# Patient Record
Sex: FEMALE | Race: WHITE | NOT HISPANIC OR LATINO | Employment: FULL TIME | URBAN - METROPOLITAN AREA
[De-identification: names, ages, dates, MRNs, and addresses within clinical notes are randomized per-mention and may not be internally consistent; named-entity substitution may affect disease eponyms.]

---

## 2019-12-25 ENCOUNTER — HOSPITAL ENCOUNTER (EMERGENCY)
Facility: HOSPITAL | Age: 24
Discharge: HOME | End: 2019-12-25
Attending: EMERGENCY MEDICINE
Payer: COMMERCIAL

## 2019-12-25 VITALS
SYSTOLIC BLOOD PRESSURE: 114 MMHG | RESPIRATION RATE: 14 BRPM | DIASTOLIC BLOOD PRESSURE: 65 MMHG | TEMPERATURE: 98 F | OXYGEN SATURATION: 99 % | BODY MASS INDEX: 19.99 KG/M2 | HEIGHT: 65 IN | WEIGHT: 120 LBS | HEART RATE: 107 BPM

## 2019-12-25 DIAGNOSIS — T40.601A OPIATE OVERDOSE, ACCIDENTAL OR UNINTENTIONAL, INITIAL ENCOUNTER: Primary | ICD-10-CM

## 2019-12-25 LAB
ALBUMIN SERPL BCP-MCNC: 3.9 G/DL (ref 3.5–5.2)
ALP SERPL-CCNC: 77 U/L (ref 55–135)
ALT SERPL W/O P-5'-P-CCNC: 14 U/L (ref 10–44)
AMPHET+METHAMPHET UR QL: NORMAL
ANION GAP SERPL CALC-SCNC: 8 MMOL/L (ref 8–16)
APAP SERPL-MCNC: <10 UG/ML (ref 10–20)
AST SERPL-CCNC: 22 U/L (ref 10–40)
B-HCG UR QL: NEGATIVE
BACTERIA #/AREA URNS HPF: ABNORMAL /HPF
BARBITURATES UR QL SCN>200 NG/ML: NEGATIVE
BASOPHILS # BLD AUTO: 0.06 K/UL (ref 0–0.2)
BASOPHILS NFR BLD: 0.4 % (ref 0–1.9)
BENZODIAZ UR QL SCN>200 NG/ML: NEGATIVE
BILIRUB SERPL-MCNC: 0.9 MG/DL (ref 0.1–1)
BILIRUB UR QL STRIP: NEGATIVE
BUN SERPL-MCNC: 18 MG/DL (ref 6–20)
BZE UR QL SCN: NEGATIVE
CALCIUM SERPL-MCNC: 9 MG/DL (ref 8.7–10.5)
CANNABINOIDS UR QL SCN: NEGATIVE
CHLORIDE SERPL-SCNC: 104 MMOL/L (ref 95–110)
CLARITY UR: ABNORMAL
CO2 SERPL-SCNC: 28 MMOL/L (ref 23–29)
COLOR UR: YELLOW
CREAT SERPL-MCNC: 0.9 MG/DL (ref 0.5–1.4)
CREAT UR-MCNC: 191 MG/DL (ref 15–325)
CTP QC/QA: YES
DIFFERENTIAL METHOD: ABNORMAL
EOSINOPHIL # BLD AUTO: 0.1 K/UL (ref 0–0.5)
EOSINOPHIL NFR BLD: 0.9 % (ref 0–8)
ERYTHROCYTE [DISTWIDTH] IN BLOOD BY AUTOMATED COUNT: 12 % (ref 11.5–14.5)
EST. GFR  (AFRICAN AMERICAN): >60 ML/MIN/1.73 M^2
EST. GFR  (NON AFRICAN AMERICAN): >60 ML/MIN/1.73 M^2
ETHANOL SERPL-MCNC: <5 MG/DL
GLUCOSE SERPL-MCNC: 119 MG/DL (ref 70–110)
GLUCOSE UR QL STRIP: NEGATIVE
HCT VFR BLD AUTO: 42.5 % (ref 37–48.5)
HGB BLD-MCNC: 13.9 G/DL (ref 12–16)
HGB UR QL STRIP: NEGATIVE
HYALINE CASTS #/AREA URNS LPF: 123 /LPF
IMM GRANULOCYTES # BLD AUTO: 0.16 K/UL (ref 0–0.04)
IMM GRANULOCYTES NFR BLD AUTO: 1.2 % (ref 0–0.5)
KETONES UR QL STRIP: ABNORMAL
LEUKOCYTE ESTERASE UR QL STRIP: ABNORMAL
LYMPHOCYTES # BLD AUTO: 2.4 K/UL (ref 1–4.8)
LYMPHOCYTES NFR BLD: 17.4 % (ref 18–48)
MCH RBC QN AUTO: 30.3 PG (ref 27–31)
MCHC RBC AUTO-ENTMCNC: 32.7 G/DL (ref 32–36)
MCV RBC AUTO: 93 FL (ref 82–98)
MICROSCOPIC COMMENT: ABNORMAL
MONOCYTES # BLD AUTO: 0.9 K/UL (ref 0.3–1)
MONOCYTES NFR BLD: 6.4 % (ref 4–15)
NEUTROPHILS # BLD AUTO: 10.1 K/UL (ref 1.8–7.7)
NEUTROPHILS NFR BLD: 73.7 % (ref 38–73)
NITRITE UR QL STRIP: NEGATIVE
NRBC BLD-RTO: 0 /100 WBC
OPIATES UR QL SCN: NORMAL
PCP UR QL SCN>25 NG/ML: NEGATIVE
PH UR STRIP: 6 [PH] (ref 5–8)
PLATELET # BLD AUTO: 273 K/UL (ref 150–350)
PMV BLD AUTO: 9.5 FL (ref 9.2–12.9)
POTASSIUM SERPL-SCNC: 3.3 MMOL/L (ref 3.5–5.1)
PROT SERPL-MCNC: 7.7 G/DL (ref 6–8.4)
PROT UR QL STRIP: ABNORMAL
RBC # BLD AUTO: 4.58 M/UL (ref 4–5.4)
RBC #/AREA URNS HPF: 9 /HPF (ref 0–4)
SODIUM SERPL-SCNC: 140 MMOL/L (ref 136–145)
SP GR UR STRIP: 1.02 (ref 1–1.03)
SQUAMOUS #/AREA URNS HPF: 6 /HPF
TOXICOLOGY INFORMATION: NORMAL
TSH SERPL DL<=0.005 MIU/L-ACNC: 0.97 UIU/ML (ref 0.34–5.6)
URN SPEC COLLECT METH UR: ABNORMAL
UROBILINOGEN UR STRIP-ACNC: NEGATIVE EU/DL
WBC # BLD AUTO: 13.67 K/UL (ref 3.9–12.7)
WBC #/AREA URNS HPF: >100 /HPF (ref 0–5)

## 2019-12-25 PROCEDURE — 85025 COMPLETE CBC W/AUTO DIFF WBC: CPT

## 2019-12-25 PROCEDURE — 87147 CULTURE TYPE IMMUNOLOGIC: CPT

## 2019-12-25 PROCEDURE — 80307 DRUG TEST PRSMV CHEM ANLYZR: CPT

## 2019-12-25 PROCEDURE — 99283 EMERGENCY DEPT VISIT LOW MDM: CPT

## 2019-12-25 PROCEDURE — 25000003 PHARM REV CODE 250: Performed by: EMERGENCY MEDICINE

## 2019-12-25 PROCEDURE — 81001 URINALYSIS AUTO W/SCOPE: CPT

## 2019-12-25 PROCEDURE — 80053 COMPREHEN METABOLIC PANEL: CPT

## 2019-12-25 PROCEDURE — 84443 ASSAY THYROID STIM HORMONE: CPT

## 2019-12-25 PROCEDURE — 81025 URINE PREGNANCY TEST: CPT | Performed by: EMERGENCY MEDICINE

## 2019-12-25 PROCEDURE — 80320 DRUG SCREEN QUANTALCOHOLS: CPT

## 2019-12-25 PROCEDURE — 87086 URINE CULTURE/COLONY COUNT: CPT

## 2019-12-25 RX ORDER — POTASSIUM CHLORIDE 20 MEQ/1
40 TABLET, EXTENDED RELEASE ORAL
Status: COMPLETED | OUTPATIENT
Start: 2019-12-25 | End: 2019-12-25

## 2019-12-25 RX ORDER — NALOXONE HYDROCHLORIDE 4 MG/.1ML
SPRAY NASAL
Qty: 1 EACH | Refills: 11 | Status: SHIPPED | OUTPATIENT
Start: 2019-12-25 | End: 2021-05-22

## 2019-12-25 RX ADMIN — POTASSIUM CHLORIDE 40 MEQ: 20 TABLET, EXTENDED RELEASE ORAL at 04:12

## 2019-12-25 NOTE — ED PROVIDER NOTES
Encounter Date: 12/25/2019       History     Chief Complaint   Patient presents with    Drug Overdose     Heroin     Patient here via EMS reportedly found unresponsive by police department was flagged down by her friend she had reportedly injected heroin states that it was her usual dose of heroin but she received it from someone she does not know patient received 4 mg of Narcan intranasally by police department with improvement her respiratory rate at EMS arrival patient was mildly tachycardic her oxygen saturation 100% she was alert and oriented she denies any homicidal suicidal ideations states that she has been abusing heroin for approximately 3 months would like referral for substance abuse she denies any auditory visual hallucinations shortness of breath states he feels to baseline at this point but slightly tremulous        Review of patient's allergies indicates:  No Known Allergies  No past medical history on file.  Past Surgical History:   Procedure Laterality Date    APPENDECTOMY       Family History   Problem Relation Age of Onset    Thyroid disease Mother     Hypertension Father      Social History     Tobacco Use    Smoking status: Current Some Day Smoker     Packs/day: 0.00    Smokeless tobacco: Never Used    Tobacco comment: patient reports she only smokes 2-3 cigarettes a week    Substance Use Topics    Alcohol use: Yes     Alcohol/week: 1.0 standard drinks     Types: 1 Glasses of wine per week    Drug use: Not on file     Comment: SEE PREVIOUS ER VISITS     Review of Systems   Constitutional: Negative.    HENT: Negative.    Eyes: Negative.    Respiratory: Negative.    Cardiovascular: Negative.    Gastrointestinal: Negative.    Endocrine: Negative.    Genitourinary: Negative.    Musculoskeletal: Negative.    Skin: Negative.    Allergic/Immunologic: Negative.    Neurological: Negative.    Hematological: Negative.    Psychiatric/Behavioral: Negative.        Physical Exam     Initial Vitals  [12/25/19 1502]   BP Pulse Resp Temp SpO2   112/62 (!) 122 14 97.9 °F (36.6 °C) 100 %      MAP       --         Physical Exam    Constitutional: She appears well-developed and well-nourished. No distress.   HENT:   Head: Normocephalic and atraumatic.   Right Ear: External ear normal.   Left Ear: External ear normal.   Nose: Nose normal.   Mouth/Throat: Oropharynx is clear and moist.   Eyes: Conjunctivae and EOM are normal. Pupils are equal, round, and reactive to light.   Neck: Normal range of motion. Neck supple.   Cardiovascular: Normal rate, regular rhythm, normal heart sounds and intact distal pulses.   Pulmonary/Chest: Breath sounds normal.   Abdominal: Soft. Bowel sounds are normal. She exhibits no distension. There is no tenderness.   Musculoskeletal: Normal range of motion. She exhibits no edema.   Neurological: She is alert and oriented to person, place, and time. She has normal strength. GCS score is 15. GCS eye subscore is 4. GCS verbal subscore is 5. GCS motor subscore is 6.   Skin: Skin is warm and dry. Capillary refill takes less than 2 seconds. There is pallor.   Psychiatric: She has a normal mood and affect. Her behavior is normal.         ED Course   Procedures  Labs Reviewed   CBC W/ AUTO DIFFERENTIAL   COMPREHENSIVE METABOLIC PANEL   TSH   URINALYSIS, REFLEX TO URINE CULTURE   DRUG SCREEN PANEL, URINE EMERGENCY   ALCOHOL,MEDICAL (ETHANOL)   ACETAMINOPHEN LEVEL   POCT URINE PREGNANCY          Imaging Results    None                                          Clinical Impression:       ICD-10-CM ICD-9-CM   1. Opiate overdose, accidental or unintentional, initial encounter T40.601A 965.00     E850.2                             Red Knowles MD  12/25/19 8495

## 2019-12-25 NOTE — ED NOTES
"Patient identifiers for Mishel Ruelas checked and correct.  LOC: Patient is awake, alert, and aware of environment and seems anxious. Patient is oriented x 3 and speaking appropriately.  APPEARANCE: Patient resting comfortably and in no acute distress. Patient is clean and well groomed, patient's clothing is properly fastened.  SKIN: The skin is warm and dry. Patient has normal skin turgor and moist mucus membrances. Injection marks to R arm , states uses IV drugs  MUSKULOSKELETAL: Patient is moving all extremities well, no obvious deformities noted. Pulses intact.   RESPIRATORY: Airway is open and patent. Respirations are spontaneous and non-labored with normal effort and rate.  CARDIAC: Patient has a normal rate and rhythm. No peripheral edema noted. Capillary refill < 3 seconds.  ABDOMEN: No distention noted. Bowel sounds active in all 4 quadrants. Soft and non-tender upon palpation.  NEUROLOGICAL: PERRL. Facial expression is symmetrical. Hand grasps are equal bilaterally. States " I got a bad hit and I think it was Fentanyl, knocked out after injection.   Allergies reported:   Review of patient's allergies indicates:  No Known Allergies  "

## 2019-12-25 NOTE — ED TRIAGE NOTES
Admit per [facility] to ER 21, 25 yo female. Friend stopped car by  when she became unresponsive and started foaming at the mouth.  gave 4 mg Narcan intranasally and she slowly started coming around. States she took usual amount of Heroin but bought it from a different source.

## 2019-12-27 LAB
BACTERIA UR CULT: ABNORMAL
BACTERIA UR CULT: ABNORMAL

## 2020-12-19 ENCOUNTER — HOSPITAL ENCOUNTER (EMERGENCY)
Facility: HOSPITAL | Age: 25
Discharge: HOME | End: 2020-12-19
Attending: EMERGENCY MEDICINE
Payer: COMMERCIAL

## 2020-12-19 VITALS
SYSTOLIC BLOOD PRESSURE: 125 MMHG | HEART RATE: 89 BPM | WEIGHT: 120 LBS | DIASTOLIC BLOOD PRESSURE: 77 MMHG | TEMPERATURE: 100 F | OXYGEN SATURATION: 99 % | RESPIRATION RATE: 18 BRPM | BODY MASS INDEX: 19.97 KG/M2

## 2020-12-19 DIAGNOSIS — B34.9 VIRAL SYNDROME: ICD-10-CM

## 2020-12-19 DIAGNOSIS — Z20.822 SUSPECTED COVID-19 VIRUS INFECTION: Primary | ICD-10-CM

## 2020-12-19 PROCEDURE — 99282 EMERGENCY DEPT VISIT SF MDM: CPT

## 2020-12-19 PROCEDURE — U0003 INFECTIOUS AGENT DETECTION BY NUCLEIC ACID (DNA OR RNA); SEVERE ACUTE RESPIRATORY SYNDROME CORONAVIRUS 2 (SARS-COV-2) (CORONAVIRUS DISEASE [COVID-19]), AMPLIFIED PROBE TECHNIQUE, MAKING USE OF HIGH THROUGHPUT TECHNOLOGIES AS DESCRIBED BY CMS-2020-01-R: HCPCS

## 2020-12-19 NOTE — Clinical Note
"Mishel Ruelas (Lizzy) was seen and treated in our emergency department on 12/19/2020.     COVID-19 is present in our communities across the state. There is limited testing for COVID at this time, so not all patients can be tested. In this situation, your employee meets the following criteria:    Mishel Ruelas has met the criteria for COVID-19 testing based upon symptoms, travel, and/or potential exposure. The test has been completed and is pending results at this time. During this time the employee is not able to work and should be quarantined per the Centers for Disease Control timelines.     If you have any questions or concerns, or if I can be of further assistance, please do not hesitate to contact me.    Sincerely,             Leighann Cantrell PA-C"

## 2020-12-19 NOTE — ED PROVIDER NOTES
Encounter Date: 12/19/2020    SCRIBE #1 NOTE: I, Randall Kassie, am scribing for, and in the presence of, Leighann Cantrell PA-C.       History     Chief Complaint   Patient presents with    Generalized Body Aches     fever; + exposure to COVID       Time seen by provider: 12:33 PM on 12/19/2020    Mishel Ruelas is a 25 y.o. female with a subjective fever and sore throat who presents for an evaluation of possible COVID-19. She states that her significant other shares similar symptoms. She also states that other coworkers tested positive. The patient denies SOB, chest pain, headache, or any other symptoms at this time. No pertinent PMHx or PSHx.     The history is provided by the patient.     Review of patient's allergies indicates:  No Known Allergies  No past medical history on file.  Past Surgical History:   Procedure Laterality Date    APPENDECTOMY       Family History   Problem Relation Age of Onset    Thyroid disease Mother     Hypertension Father      Social History     Tobacco Use    Smoking status: Current Some Day Smoker     Packs/day: 0.00    Smokeless tobacco: Never Used    Tobacco comment: patient reports she only smokes 2-3 cigarettes a week    Substance Use Topics    Alcohol use: Yes     Alcohol/week: 1.0 standard drinks     Types: 1 Glasses of wine per week    Drug use: Not on file     Comment: SEE PREVIOUS ER VISITS     Review of Systems   Constitutional: Positive for fever. Negative for activity change, appetite change and fatigue.   HENT: Positive for sore throat. Negative for congestion and rhinorrhea.    Respiratory: Negative for cough, chest tightness, shortness of breath and wheezing.    Cardiovascular: Negative for chest pain and palpitations.   Gastrointestinal: Negative for diarrhea, nausea and vomiting.   Musculoskeletal: Negative for arthralgias and myalgias.   Skin: Negative for rash.   Neurological: Negative for weakness, light-headedness, numbness and headaches.        Physical Exam     Initial Vitals   BP Pulse Resp Temp SpO2   12/19/20 1156 12/19/20 1156 12/19/20 1156 12/19/20 1200 12/19/20 1156   125/77 89 18 99.8 °F (37.7 °C) 99 %      MAP       --                Physical Exam    Nursing note and vitals reviewed.  Constitutional: She appears well-developed and well-nourished. She is cooperative.  Non-toxic appearance. She does not have a sickly appearance.   HENT:   Head: Normocephalic and atraumatic.   Right Ear: External ear normal.   Left Ear: External ear normal.   Nose: Nose normal.   Mouth/Throat: Uvula is midline and oropharynx is clear and moist.   Eyes: Conjunctivae and lids are normal.   Neck: Normal range of motion and full passive range of motion without pain. Neck supple.   Cardiovascular: Normal rate, regular rhythm and normal heart sounds. Exam reveals no gallop and no friction rub.    No murmur heard.  Pulmonary/Chest: Breath sounds normal. She has no wheezes. She has no rhonchi. She has no rales.   Equal, bilateral breath sounds noted without wheezing.     Abdominal: Normal appearance.   Neurological: She is alert.   Skin: Skin is warm, dry and intact. No rash noted.         ED Course   Procedures  Labs Reviewed   SARS-COV-2 (COVID-19) QUALITATIVE PCR          Imaging Results    None          Medical Decision Making:   History:   Old Medical Records: I decided to obtain old medical records.  Differential Diagnosis:   Influenza  Pneumonia  Strep pharyngitis  Meningitis  Viral syndrome  COVID-19   Clinical Tests:   Lab Tests: Ordered and Reviewed       APC / Resident Notes:   COVID-19 test is pending.  Symptoms likely viral.  No need for further imaging or testing at this time.  Low suspicion for acute bacterial infection.  We feel comfortable discharging her to follow up with her primary care provider for re-evaluation as needed.  She voices understanding is agreeable to plan.  She is given specific return precautions.       Scribe Attestation:   Scribe  #1: I performed the above scribed service and the documentation accurately describes the services I performed. I attest to the accuracy of the note.    Attending Attestation:     Physician Attestation Statement for NP/PA:   I discussed this assessment and plan of this patient with the NP/PA, but I did not personally examine the patient. The face to face encounter was performed by the NP/PA.    Other NP/PA Attestation Additions:      Medical Decision Making: Mishel Ruelas is a 25 y.o. female presenting with upper respiratory symptoms consistent with viral URI.  She does have exposure concerning for COVID-19 with 13 testing sent.  There is no sign of airway compromise associated with sore throat.  Low suspicion for emergent process such as epiglottitis, bacterial tracheitis.  I doubt bacterial pneumonia or sepsis.  I do not think other ED diagnostic testing is necessary.  Normal work of breathing no hypoxia here.  I doubt bacterial pneumonia and do not think antibiotics are indicated.  Follow-up with PCP.  Self isolate at home.  Return precautions reviewed.         I, Leighann Cantrell PA-C, personally performed the services described in this documentation. All medical record entries made by the scribe were at my direction and in my presence.  I have reviewed the chart and agree that the record reflects my personal performance and is accurate and complete. Leighann Cantrell PA-C.  5:43 PM 12/19/2020                    Clinical Impression:     ICD-10-CM ICD-9-CM   1. Suspected COVID-19 virus infection  Z20.828 V01.79   2. Viral syndrome  B34.9 079.99                      Disposition:   Disposition: Discharged     ED Disposition Condition    Discharge Stable        ED Prescriptions     None        Follow-up Information     Follow up With Specialties Details Why Contact Info    [facility] Emergency Medicine  As needed, If symptoms worsen [address] [place] [address]  [id number]  [phone]                                       Leighann Cantrell PA-C  12/19/20 1744

## 2020-12-20 LAB — SARS-COV-2 RNA RESP QL NAA+PROBE: NOT DETECTED

## 2021-05-10 ENCOUNTER — PATIENT MESSAGE (OUTPATIENT)
Dept: RESEARCH | Facility: HOSPITAL | Age: 26
End: 2021-05-10

## 2021-05-22 ENCOUNTER — HOSPITAL ENCOUNTER (OUTPATIENT)
Facility: HOSPITAL | Age: 26
Discharge: LEFT BEFORE DISCHARGE | End: 2021-05-22
Attending: EMERGENCY MEDICINE | Admitting: FAMILY MEDICINE
Payer: COMMERCIAL

## 2021-05-22 VITALS
SYSTOLIC BLOOD PRESSURE: 98 MMHG | HEART RATE: 65 BPM | OXYGEN SATURATION: 98 % | BODY MASS INDEX: 22.85 KG/M2 | TEMPERATURE: 99 F | WEIGHT: 137.13 LBS | DIASTOLIC BLOOD PRESSURE: 61 MMHG | RESPIRATION RATE: 16 BRPM | HEIGHT: 65 IN

## 2021-05-22 DIAGNOSIS — R07.9 CHEST PAIN: ICD-10-CM

## 2021-05-22 DIAGNOSIS — R56.9 SEIZURE: ICD-10-CM

## 2021-05-22 LAB
ALBUMIN SERPL BCP-MCNC: 4 G/DL (ref 3.5–5.2)
ALP SERPL-CCNC: 86 U/L (ref 55–135)
ALT SERPL W/O P-5'-P-CCNC: 17 U/L (ref 10–44)
AMPHET+METHAMPHET UR QL: NEGATIVE
ANION GAP SERPL CALC-SCNC: 10 MMOL/L (ref 8–16)
AST SERPL-CCNC: 19 U/L (ref 10–40)
B-HCG UR QL: NEGATIVE
BACTERIA #/AREA URNS HPF: ABNORMAL /HPF
BARBITURATES UR QL SCN>200 NG/ML: NEGATIVE
BASOPHILS # BLD AUTO: 0.04 K/UL (ref 0–0.2)
BASOPHILS NFR BLD: 0.6 % (ref 0–1.9)
BENZODIAZ UR QL SCN>200 NG/ML: NEGATIVE
BILIRUB SERPL-MCNC: 0.7 MG/DL (ref 0.1–1)
BILIRUB UR QL STRIP: NEGATIVE
BUN SERPL-MCNC: 8 MG/DL (ref 6–20)
BZE UR QL SCN: NEGATIVE
CALCIUM SERPL-MCNC: 9.1 MG/DL (ref 8.7–10.5)
CANNABINOIDS UR QL SCN: NEGATIVE
CHLORIDE SERPL-SCNC: 103 MMOL/L (ref 95–110)
CLARITY UR: CLEAR
CO2 SERPL-SCNC: 26 MMOL/L (ref 23–29)
COLOR UR: YELLOW
CREAT SERPL-MCNC: 0.7 MG/DL (ref 0.5–1.4)
CREAT UR-MCNC: 122 MG/DL (ref 15–325)
CTP QC/QA: YES
DIFFERENTIAL METHOD: ABNORMAL
EOSINOPHIL # BLD AUTO: 0 K/UL (ref 0–0.5)
EOSINOPHIL NFR BLD: 0.4 % (ref 0–8)
ERYTHROCYTE [DISTWIDTH] IN BLOOD BY AUTOMATED COUNT: 11.8 % (ref 11.5–14.5)
EST. GFR  (AFRICAN AMERICAN): >60 ML/MIN/1.73 M^2
EST. GFR  (NON AFRICAN AMERICAN): >60 ML/MIN/1.73 M^2
GLUCOSE SERPL-MCNC: 98 MG/DL (ref 70–110)
GLUCOSE UR QL STRIP: NEGATIVE
HCT VFR BLD AUTO: 40.8 % (ref 37–48.5)
HGB BLD-MCNC: 13.9 G/DL (ref 12–16)
HGB UR QL STRIP: NEGATIVE
HYALINE CASTS #/AREA URNS LPF: 9 /LPF
IMM GRANULOCYTES # BLD AUTO: 0.02 K/UL (ref 0–0.04)
IMM GRANULOCYTES NFR BLD AUTO: 0.3 % (ref 0–0.5)
KETONES UR QL STRIP: ABNORMAL
LEUKOCYTE ESTERASE UR QL STRIP: ABNORMAL
LYMPHOCYTES # BLD AUTO: 2.2 K/UL (ref 1–4.8)
LYMPHOCYTES NFR BLD: 30.7 % (ref 18–48)
MAGNESIUM SERPL-MCNC: 1.9 MG/DL (ref 1.6–2.6)
MCH RBC QN AUTO: 30.1 PG (ref 27–31)
MCHC RBC AUTO-ENTMCNC: 34.1 G/DL (ref 32–36)
MCV RBC AUTO: 88 FL (ref 82–98)
MICROSCOPIC COMMENT: ABNORMAL
MONOCYTES # BLD AUTO: 0.3 K/UL (ref 0.3–1)
MONOCYTES NFR BLD: 3.5 % (ref 4–15)
NEUTROPHILS # BLD AUTO: 4.7 K/UL (ref 1.8–7.7)
NEUTROPHILS NFR BLD: 64.5 % (ref 38–73)
NITRITE UR QL STRIP: NEGATIVE
NRBC BLD-RTO: 0 /100 WBC
OPIATES UR QL SCN: NEGATIVE
PCP UR QL SCN>25 NG/ML: NEGATIVE
PH UR STRIP: 7 [PH] (ref 5–8)
PLATELET # BLD AUTO: 189 K/UL (ref 150–450)
PMV BLD AUTO: 10 FL (ref 9.2–12.9)
POTASSIUM SERPL-SCNC: 4.2 MMOL/L (ref 3.5–5.1)
PROT SERPL-MCNC: 7.7 G/DL (ref 6–8.4)
PROT UR QL STRIP: ABNORMAL
RBC # BLD AUTO: 4.62 M/UL (ref 4–5.4)
RBC #/AREA URNS HPF: 1 /HPF (ref 0–4)
SARS-COV-2 RDRP RESP QL NAA+PROBE: NEGATIVE
SODIUM SERPL-SCNC: 139 MMOL/L (ref 136–145)
SP GR UR STRIP: 1.02 (ref 1–1.03)
SQUAMOUS #/AREA URNS HPF: 13 /HPF
TOXICOLOGY INFORMATION: NORMAL
URN SPEC COLLECT METH UR: ABNORMAL
UROBILINOGEN UR STRIP-ACNC: NEGATIVE EU/DL
WBC # BLD AUTO: 7.21 K/UL (ref 3.9–12.7)
WBC #/AREA URNS HPF: 13 /HPF (ref 0–5)

## 2021-05-22 PROCEDURE — U0002 COVID-19 LAB TEST NON-CDC: HCPCS | Performed by: FAMILY MEDICINE

## 2021-05-22 PROCEDURE — 80307 DRUG TEST PRSMV CHEM ANLYZR: CPT | Performed by: EMERGENCY MEDICINE

## 2021-05-22 PROCEDURE — 96365 THER/PROPH/DIAG IV INF INIT: CPT

## 2021-05-22 PROCEDURE — 87086 URINE CULTURE/COLONY COUNT: CPT | Performed by: EMERGENCY MEDICINE

## 2021-05-22 PROCEDURE — 81025 URINE PREGNANCY TEST: CPT | Performed by: EMERGENCY MEDICINE

## 2021-05-22 PROCEDURE — 81001 URINALYSIS AUTO W/SCOPE: CPT | Mod: 59 | Performed by: EMERGENCY MEDICINE

## 2021-05-22 PROCEDURE — 99285 EMERGENCY DEPT VISIT HI MDM: CPT | Mod: 25

## 2021-05-22 PROCEDURE — 85025 COMPLETE CBC W/AUTO DIFF WBC: CPT | Performed by: EMERGENCY MEDICINE

## 2021-05-22 PROCEDURE — 80053 COMPREHEN METABOLIC PANEL: CPT | Performed by: EMERGENCY MEDICINE

## 2021-05-22 PROCEDURE — G0378 HOSPITAL OBSERVATION PER HR: HCPCS

## 2021-05-22 PROCEDURE — 63600175 PHARM REV CODE 636 W HCPCS: Performed by: EMERGENCY MEDICINE

## 2021-05-22 PROCEDURE — 83735 ASSAY OF MAGNESIUM: CPT | Performed by: EMERGENCY MEDICINE

## 2021-05-22 PROCEDURE — 96366 THER/PROPH/DIAG IV INF ADDON: CPT

## 2021-05-22 RX ORDER — IBUPROFEN 200 MG
24 TABLET ORAL
Status: DISCONTINUED | OUTPATIENT
Start: 2021-05-22 | End: 2021-05-23 | Stop reason: HOSPADM

## 2021-05-22 RX ORDER — SODIUM CHLORIDE 0.9 % (FLUSH) 0.9 %
10 SYRINGE (ML) INJECTION
Status: DISCONTINUED | OUTPATIENT
Start: 2021-05-22 | End: 2021-05-23 | Stop reason: HOSPADM

## 2021-05-22 RX ORDER — LORAZEPAM 2 MG/ML
2 INJECTION INTRAMUSCULAR EVERY 10 MIN PRN
Status: DISCONTINUED | OUTPATIENT
Start: 2021-05-22 | End: 2021-05-23 | Stop reason: HOSPADM

## 2021-05-22 RX ORDER — LEVETIRACETAM 5 MG/ML
500 INJECTION INTRAVASCULAR EVERY 12 HOURS
Status: DISCONTINUED | OUTPATIENT
Start: 2021-05-23 | End: 2021-05-23 | Stop reason: HOSPADM

## 2021-05-22 RX ORDER — TALC
6 POWDER (GRAM) TOPICAL NIGHTLY PRN
Status: DISCONTINUED | OUTPATIENT
Start: 2021-05-22 | End: 2021-05-23 | Stop reason: HOSPADM

## 2021-05-22 RX ORDER — IBUPROFEN 200 MG
16 TABLET ORAL
Status: DISCONTINUED | OUTPATIENT
Start: 2021-05-22 | End: 2021-05-23 | Stop reason: HOSPADM

## 2021-05-22 RX ORDER — GLUCAGON 1 MG
1 KIT INJECTION
Status: DISCONTINUED | OUTPATIENT
Start: 2021-05-22 | End: 2021-05-23 | Stop reason: HOSPADM

## 2021-05-22 RX ORDER — IPRATROPIUM BROMIDE AND ALBUTEROL SULFATE 2.5; .5 MG/3ML; MG/3ML
3 SOLUTION RESPIRATORY (INHALATION) EVERY 4 HOURS PRN
Status: DISCONTINUED | OUTPATIENT
Start: 2021-05-22 | End: 2021-05-23 | Stop reason: HOSPADM

## 2021-05-22 RX ORDER — ACETAMINOPHEN 325 MG/1
650 TABLET ORAL EVERY 8 HOURS PRN
Status: DISCONTINUED | OUTPATIENT
Start: 2021-05-22 | End: 2021-05-23 | Stop reason: HOSPADM

## 2021-05-22 RX ORDER — POLYETHYLENE GLYCOL 3350 17 G/17G
17 POWDER, FOR SOLUTION ORAL 2 TIMES DAILY PRN
Status: DISCONTINUED | OUTPATIENT
Start: 2021-05-22 | End: 2021-05-23 | Stop reason: HOSPADM

## 2021-05-22 RX ORDER — MORPHINE SULFATE 4 MG/ML
4 INJECTION, SOLUTION INTRAMUSCULAR; INTRAVENOUS EVERY 4 HOURS PRN
Status: DISCONTINUED | OUTPATIENT
Start: 2021-05-22 | End: 2021-05-23 | Stop reason: HOSPADM

## 2021-05-22 RX ORDER — ONDANSETRON 2 MG/ML
4 INJECTION INTRAMUSCULAR; INTRAVENOUS EVERY 8 HOURS PRN
Status: DISCONTINUED | OUTPATIENT
Start: 2021-05-22 | End: 2021-05-23 | Stop reason: HOSPADM

## 2021-05-22 RX ORDER — LEVETIRACETAM 10 MG/ML
1000 INJECTION INTRAVASCULAR
Status: COMPLETED | OUTPATIENT
Start: 2021-05-22 | End: 2021-05-22

## 2021-05-22 RX ADMIN — LEVETIRACETAM INJECTION 1000 MG: 10 INJECTION INTRAVENOUS at 05:05

## 2021-05-23 LAB
BACTERIA UR CULT: NORMAL
BACTERIA UR CULT: NORMAL

## 2023-03-17 ENCOUNTER — HOSPITAL ENCOUNTER (INPATIENT)
Facility: HOSPITAL | Age: 28
LOS: 3 days | DRG: 917 | End: 2023-03-20
Attending: EMERGENCY MEDICINE | Admitting: INTERNAL MEDICINE
Payer: COMMERCIAL

## 2023-03-17 DIAGNOSIS — R07.9 CHEST PAIN: ICD-10-CM

## 2023-03-17 DIAGNOSIS — J96.01 ACUTE HYPOXEMIC RESPIRATORY FAILURE: Primary | ICD-10-CM

## 2023-03-17 DIAGNOSIS — I46.9 CARDIAC ARREST: ICD-10-CM

## 2023-03-17 LAB
ALBUMIN SERPL BCP-MCNC: 3.8 G/DL (ref 3.5–5.2)
ALLENS TEST: ABNORMAL
ALLENS TEST: ABNORMAL
ALP SERPL-CCNC: 91 U/L (ref 55–135)
ALT SERPL W/O P-5'-P-CCNC: 75 U/L (ref 10–44)
AMPHET+METHAMPHET UR QL: NEGATIVE
ANION GAP SERPL CALC-SCNC: 14 MMOL/L (ref 8–16)
AST SERPL-CCNC: 188 U/L (ref 10–40)
BACTERIA #/AREA URNS HPF: NEGATIVE /HPF
BARBITURATES UR QL SCN>200 NG/ML: NEGATIVE
BASOPHILS # BLD AUTO: 0.06 K/UL (ref 0–0.2)
BASOPHILS NFR BLD: 0.6 % (ref 0–1.9)
BENZODIAZ UR QL SCN>200 NG/ML: NEGATIVE
BILIRUB SERPL-MCNC: 0.6 MG/DL (ref 0.1–1)
BILIRUB UR QL STRIP: NEGATIVE
BNP SERPL-MCNC: 24 PG/ML (ref 0–99)
BUN SERPL-MCNC: 19 MG/DL (ref 6–20)
BZE UR QL SCN: NEGATIVE
CALCIUM SERPL-MCNC: 8.8 MG/DL (ref 8.7–10.5)
CANNABINOIDS UR QL SCN: NEGATIVE
CHLORIDE SERPL-SCNC: 103 MMOL/L (ref 95–110)
CK SERPL-CCNC: 66 U/L (ref 20–180)
CLARITY UR: CLEAR
CO2 SERPL-SCNC: 19 MMOL/L (ref 23–29)
COLOR UR: YELLOW
CREAT SERPL-MCNC: 1.2 MG/DL (ref 0.5–1.4)
CREAT UR-MCNC: 141 MG/DL (ref 15–325)
DELSYS: ABNORMAL
DELSYS: ABNORMAL
DIFFERENTIAL METHOD: ABNORMAL
EOSINOPHIL # BLD AUTO: 0 K/UL (ref 0–0.5)
EOSINOPHIL NFR BLD: 0.4 % (ref 0–8)
ERYTHROCYTE [DISTWIDTH] IN BLOOD BY AUTOMATED COUNT: 11.6 % (ref 11.5–14.5)
ERYTHROCYTE [SEDIMENTATION RATE] IN BLOOD BY WESTERGREN METHOD: 24 MM/H
ERYTHROCYTE [SEDIMENTATION RATE] IN BLOOD BY WESTERGREN METHOD: 30 MM/H
EST. GFR  (NO RACE VARIABLE): >60 ML/MIN/1.73 M^2
FIO2: 100
FIO2: 80
GLUCOSE SERPL-MCNC: 168 MG/DL (ref 70–110)
GLUCOSE SERPL-MCNC: 178 MG/DL (ref 70–110)
GLUCOSE SERPL-MCNC: 206 MG/DL (ref 70–110)
GLUCOSE SERPL-MCNC: 216 MG/DL (ref 70–110)
GLUCOSE UR QL STRIP: NEGATIVE
HCO3 UR-SCNC: 19.3 MMOL/L (ref 24–28)
HCO3 UR-SCNC: 20.4 MMOL/L (ref 24–28)
HCT VFR BLD AUTO: 43.1 % (ref 37–48.5)
HCT VFR BLD CALC: 41 %PCV (ref 36–54)
HCT VFR BLD CALC: 48 %PCV (ref 36–54)
HGB BLD-MCNC: 14.2 G/DL (ref 12–16)
HGB UR QL STRIP: ABNORMAL
HYALINE CASTS #/AREA URNS LPF: 5 /LPF
IMM GRANULOCYTES # BLD AUTO: 0.28 K/UL (ref 0–0.04)
IMM GRANULOCYTES NFR BLD AUTO: 2.6 % (ref 0–0.5)
KETONES UR QL STRIP: NEGATIVE
LEUKOCYTE ESTERASE UR QL STRIP: NEGATIVE
LYMPHOCYTES # BLD AUTO: 4.7 K/UL (ref 1–4.8)
LYMPHOCYTES NFR BLD: 42.8 % (ref 18–48)
MAGNESIUM SERPL-MCNC: 2.5 MG/DL (ref 1.6–2.6)
MCH RBC QN AUTO: 33.3 PG (ref 27–31)
MCHC RBC AUTO-ENTMCNC: 32.9 G/DL (ref 32–36)
MCV RBC AUTO: 101 FL (ref 82–98)
MICROSCOPIC COMMENT: ABNORMAL
MIN VOL: 12.4
MIN VOL: 18.1
MODE: ABNORMAL
MODE: ABNORMAL
MONOCYTES # BLD AUTO: 0.3 K/UL (ref 0.3–1)
MONOCYTES NFR BLD: 3 % (ref 4–15)
NEUTROPHILS # BLD AUTO: 5.5 K/UL (ref 1.8–7.7)
NEUTROPHILS NFR BLD: 50.6 % (ref 38–73)
NITRITE UR QL STRIP: NEGATIVE
NRBC BLD-RTO: 0 /100 WBC
OPIATES UR QL SCN: NEGATIVE
PCO2 BLDA: 36.4 MMHG (ref 35–45)
PCO2 BLDA: 66.2 MMHG (ref 35–45)
PCP UR QL SCN>25 NG/ML: NEGATIVE
PEEP: 5
PEEP: 5
PH SMN: 7.1 [PH] (ref 7.35–7.45)
PH SMN: 7.33 [PH] (ref 7.35–7.45)
PH UR STRIP: 7 [PH] (ref 5–8)
PIP: 26
PIP: 27
PLATELET # BLD AUTO: 252 K/UL (ref 150–450)
PMV BLD AUTO: 10.6 FL (ref 9.2–12.9)
PO2 BLDA: 195 MMHG (ref 80–100)
PO2 BLDA: 532 MMHG (ref 80–100)
POC BE: -7 MMOL/L
POC BE: -9 MMOL/L
POC IONIZED CALCIUM: 1.2 MMOL/L (ref 1.06–1.42)
POC IONIZED CALCIUM: 1.25 MMOL/L (ref 1.06–1.42)
POC SATURATED O2: 100 % (ref 95–100)
POC SATURATED O2: 99 % (ref 95–100)
POC TCO2: 20 MMOL/L (ref 23–27)
POC TCO2: 22 MMOL/L (ref 23–27)
POTASSIUM BLD-SCNC: 3.4 MMOL/L (ref 3.5–5.1)
POTASSIUM BLD-SCNC: 6.4 MMOL/L (ref 3.5–5.1)
POTASSIUM SERPL-SCNC: 7.3 MMOL/L (ref 3.5–5.1)
PROT SERPL-MCNC: 6.9 G/DL (ref 6–8.4)
PROT UR QL STRIP: ABNORMAL
RBC # BLD AUTO: 4.26 M/UL (ref 4–5.4)
RBC #/AREA URNS HPF: 1 /HPF (ref 0–4)
SAMPLE: ABNORMAL
SAMPLE: ABNORMAL
SITE: ABNORMAL
SITE: ABNORMAL
SODIUM BLD-SCNC: 138 MMOL/L (ref 136–145)
SODIUM BLD-SCNC: 141 MMOL/L (ref 136–145)
SODIUM SERPL-SCNC: 136 MMOL/L (ref 136–145)
SP GR UR STRIP: 1.02 (ref 1–1.03)
SP02: 97
SP02: 99
SQUAMOUS #/AREA URNS HPF: 4 /HPF
TOXICOLOGY INFORMATION: NORMAL
TROPONIN I SERPL HS-MCNC: 22.8 PG/ML (ref 0–14.9)
URN SPEC COLLECT METH UR: ABNORMAL
UROBILINOGEN UR STRIP-ACNC: NEGATIVE EU/DL
VT: 500
VT: 500
WBC # BLD AUTO: 10.89 K/UL (ref 3.9–12.7)
WBC #/AREA URNS HPF: 3 /HPF (ref 0–5)

## 2023-03-17 PROCEDURE — 86900 BLOOD TYPING SEROLOGIC ABO: CPT | Performed by: EMERGENCY MEDICINE

## 2023-03-17 PROCEDURE — 82962 GLUCOSE BLOOD TEST: CPT

## 2023-03-17 PROCEDURE — 99900026 HC AIRWAY MAINTENANCE (STAT)

## 2023-03-17 PROCEDURE — 84132 ASSAY OF SERUM POTASSIUM: CPT

## 2023-03-17 PROCEDURE — 25000003 PHARM REV CODE 250: Performed by: INTERNAL MEDICINE

## 2023-03-17 PROCEDURE — 82330 ASSAY OF CALCIUM: CPT

## 2023-03-17 PROCEDURE — 83735 ASSAY OF MAGNESIUM: CPT | Performed by: EMERGENCY MEDICINE

## 2023-03-17 PROCEDURE — 82550 ASSAY OF CK (CPK): CPT | Performed by: INTERNAL MEDICINE

## 2023-03-17 PROCEDURE — 93010 ELECTROCARDIOGRAM REPORT: CPT | Mod: 76,,, | Performed by: INTERNAL MEDICINE

## 2023-03-17 PROCEDURE — 63600175 PHARM REV CODE 636 W HCPCS: Performed by: EMERGENCY MEDICINE

## 2023-03-17 PROCEDURE — 94761 N-INVAS EAR/PLS OXIMETRY MLT: CPT

## 2023-03-17 PROCEDURE — 93010 EKG 12-LEAD: ICD-10-PCS | Mod: ,,, | Performed by: INTERNAL MEDICINE

## 2023-03-17 PROCEDURE — 80307 DRUG TEST PRSMV CHEM ANLYZR: CPT | Performed by: EMERGENCY MEDICINE

## 2023-03-17 PROCEDURE — 85014 HEMATOCRIT: CPT

## 2023-03-17 PROCEDURE — 93005 ELECTROCARDIOGRAM TRACING: CPT | Performed by: INTERNAL MEDICINE

## 2023-03-17 PROCEDURE — 85025 COMPLETE CBC W/AUTO DIFF WBC: CPT | Performed by: EMERGENCY MEDICINE

## 2023-03-17 PROCEDURE — 99285 EMERGENCY DEPT VISIT HI MDM: CPT | Mod: 25

## 2023-03-17 PROCEDURE — 96375 TX/PRO/DX INJ NEW DRUG ADDON: CPT

## 2023-03-17 PROCEDURE — 84145 PROCALCITONIN (PCT): CPT | Performed by: INTERNAL MEDICINE

## 2023-03-17 PROCEDURE — 84484 ASSAY OF TROPONIN QUANT: CPT | Performed by: EMERGENCY MEDICINE

## 2023-03-17 PROCEDURE — 36600 WITHDRAWAL OF ARTERIAL BLOOD: CPT

## 2023-03-17 PROCEDURE — 25000242 PHARM REV CODE 250 ALT 637 W/ HCPCS: Performed by: EMERGENCY MEDICINE

## 2023-03-17 PROCEDURE — 99900035 HC TECH TIME PER 15 MIN (STAT)

## 2023-03-17 PROCEDURE — 80053 COMPREHEN METABOLIC PANEL: CPT | Performed by: EMERGENCY MEDICINE

## 2023-03-17 PROCEDURE — 84295 ASSAY OF SERUM SODIUM: CPT

## 2023-03-17 PROCEDURE — 83880 ASSAY OF NATRIURETIC PEPTIDE: CPT | Performed by: EMERGENCY MEDICINE

## 2023-03-17 PROCEDURE — 36415 COLL VENOUS BLD VENIPUNCTURE: CPT | Performed by: INTERNAL MEDICINE

## 2023-03-17 PROCEDURE — 31500 INSERT EMERGENCY AIRWAY: CPT

## 2023-03-17 PROCEDURE — 85379 FIBRIN DEGRADATION QUANT: CPT | Performed by: INTERNAL MEDICINE

## 2023-03-17 PROCEDURE — 82803 BLOOD GASES ANY COMBINATION: CPT

## 2023-03-17 PROCEDURE — 93010 ELECTROCARDIOGRAM REPORT: CPT | Mod: ,,, | Performed by: INTERNAL MEDICINE

## 2023-03-17 PROCEDURE — 96365 THER/PROPH/DIAG IV INF INIT: CPT

## 2023-03-17 PROCEDURE — 20000000 HC ICU ROOM

## 2023-03-17 PROCEDURE — 81001 URINALYSIS AUTO W/SCOPE: CPT | Performed by: EMERGENCY MEDICINE

## 2023-03-17 PROCEDURE — 94002 VENT MGMT INPAT INIT DAY: CPT

## 2023-03-17 PROCEDURE — 81001 URINALYSIS AUTO W/SCOPE: CPT | Mod: 91 | Performed by: INTERNAL MEDICINE

## 2023-03-17 PROCEDURE — 83605 ASSAY OF LACTIC ACID: CPT | Performed by: INTERNAL MEDICINE

## 2023-03-17 PROCEDURE — 25000003 PHARM REV CODE 250: Performed by: EMERGENCY MEDICINE

## 2023-03-17 PROCEDURE — 31720 CLEARANCE OF AIRWAYS: CPT

## 2023-03-17 RX ORDER — SODIUM CHLORIDE 0.9 % (FLUSH) 0.9 %
10 SYRINGE (ML) INJECTION
Status: DISCONTINUED | OUTPATIENT
Start: 2023-03-18 | End: 2023-03-20 | Stop reason: HOSPADM

## 2023-03-17 RX ORDER — PROPOFOL 10 MG/ML
0-50 INJECTION, EMULSION INTRAVENOUS CONTINUOUS
Status: DISCONTINUED | OUTPATIENT
Start: 2023-03-17 | End: 2023-03-20 | Stop reason: HOSPADM

## 2023-03-17 RX ORDER — NOREPINEPHRINE BITARTRATE/D5W 4MG/250ML
0-3 PLASTIC BAG, INJECTION (ML) INTRAVENOUS CONTINUOUS
Status: DISCONTINUED | OUTPATIENT
Start: 2023-03-17 | End: 2023-03-20 | Stop reason: HOSPADM

## 2023-03-17 RX ORDER — CALCIUM GLUCONATE 20 MG/ML
1 INJECTION, SOLUTION INTRAVENOUS
Status: COMPLETED | OUTPATIENT
Start: 2023-03-17 | End: 2023-03-17

## 2023-03-17 RX ORDER — ALBUTEROL SULFATE 0.83 MG/ML
10 SOLUTION RESPIRATORY (INHALATION)
Status: COMPLETED | OUTPATIENT
Start: 2023-03-17 | End: 2023-03-17

## 2023-03-17 RX ORDER — SODIUM BICARBONATE 1 MEQ/ML
50 SYRINGE (ML) INTRAVENOUS
Status: COMPLETED | OUTPATIENT
Start: 2023-03-17 | End: 2023-03-17

## 2023-03-17 RX ORDER — INDOMETHACIN 25 MG/1
50 CAPSULE ORAL ONCE
Status: COMPLETED | OUTPATIENT
Start: 2023-03-17 | End: 2023-03-17

## 2023-03-17 RX ORDER — FENTANYL CITRATE 50 UG/ML
100 INJECTION, SOLUTION INTRAMUSCULAR; INTRAVENOUS
Status: COMPLETED | OUTPATIENT
Start: 2023-03-17 | End: 2023-03-17

## 2023-03-17 RX ADMIN — DEXTROSE 0.03 MCG/KG/MIN: 50 INJECTION, SOLUTION INTRAVENOUS at 09:03

## 2023-03-17 RX ADMIN — ALBUTEROL SULFATE 10 MG: 2.5 SOLUTION RESPIRATORY (INHALATION) at 10:03

## 2023-03-17 RX ADMIN — DEXTROSE MONOHYDRATE 25 G: 25 INJECTION, SOLUTION INTRAVENOUS at 10:03

## 2023-03-17 RX ADMIN — CALCIUM GLUCONATE 1 G: 20 INJECTION, SOLUTION INTRAVENOUS at 10:03

## 2023-03-17 RX ADMIN — SODIUM BICARBONATE 50 MEQ: 84 INJECTION INTRAVENOUS at 09:03

## 2023-03-17 RX ADMIN — FENTANYL CITRATE 100 MCG: 50 INJECTION, SOLUTION INTRAMUSCULAR; INTRAVENOUS at 11:03

## 2023-03-17 RX ADMIN — PROPOFOL 15 MCG/KG/MIN: 10 INJECTION, EMULSION INTRAVENOUS at 10:03

## 2023-03-17 RX ADMIN — SODIUM CHLORIDE 1000 ML: 9 INJECTION, SOLUTION INTRAVENOUS at 09:03

## 2023-03-17 RX ADMIN — NOREPINEPHRINE BITARTRATE 0.05 MCG/KG/MIN: 4 INJECTION, SOLUTION INTRAVENOUS at 09:03

## 2023-03-17 RX ADMIN — SODIUM BICARBONATE 50 MEQ: 84 INJECTION INTRAVENOUS at 11:03

## 2023-03-17 RX ADMIN — INSULIN HUMAN 10 UNITS: 100 INJECTION, SOLUTION PARENTERAL at 10:03

## 2023-03-18 ENCOUNTER — ANESTHESIA EVENT (OUTPATIENT)
Dept: INTENSIVE CARE | Facility: HOSPITAL | Age: 28
DRG: 917 | End: 2023-03-18
Payer: COMMERCIAL

## 2023-03-18 ENCOUNTER — ANESTHESIA (OUTPATIENT)
Dept: INTENSIVE CARE | Facility: HOSPITAL | Age: 28
DRG: 917 | End: 2023-03-18
Payer: COMMERCIAL

## 2023-03-18 ENCOUNTER — CLINICAL SUPPORT (OUTPATIENT)
Dept: CARDIOLOGY | Facility: HOSPITAL | Age: 28
DRG: 917 | End: 2023-03-18
Attending: INTERNAL MEDICINE
Payer: COMMERCIAL

## 2023-03-18 VITALS — WEIGHT: 143 LBS | HEIGHT: 65 IN | BODY MASS INDEX: 23.82 KG/M2

## 2023-03-18 PROBLEM — R74.01 TRANSAMINITIS: Status: ACTIVE | Noted: 2023-03-18

## 2023-03-18 PROBLEM — E87.20 LACTIC ACIDOSIS: Status: ACTIVE | Noted: 2023-03-18

## 2023-03-18 PROBLEM — E83.39 HYPOPHOSPHATEMIA: Status: ACTIVE | Noted: 2023-03-18

## 2023-03-18 PROBLEM — I46.9 CARDIAC ARREST: Status: ACTIVE | Noted: 2023-03-18

## 2023-03-18 PROBLEM — J96.01 ACUTE HYPOXEMIC RESPIRATORY FAILURE: Status: ACTIVE | Noted: 2023-03-18

## 2023-03-18 PROBLEM — I50.21 ACUTE SYSTOLIC CONGESTIVE HEART FAILURE: Status: ACTIVE | Noted: 2023-03-18

## 2023-03-18 PROBLEM — D72.829 LEUKOCYTOSIS: Status: ACTIVE | Noted: 2023-03-18

## 2023-03-18 PROBLEM — E87.6 HYPOKALEMIA: Status: ACTIVE | Noted: 2023-03-18

## 2023-03-18 LAB
ABO + RH BLD: NORMAL
ALBUMIN SERPL BCP-MCNC: 3.3 G/DL (ref 3.5–5.2)
ALBUMIN SERPL BCP-MCNC: 3.6 G/DL (ref 3.5–5.2)
ALBUMIN SERPL BCP-MCNC: 3.6 G/DL (ref 3.5–5.2)
ALBUMIN SERPL BCP-MCNC: 3.9 G/DL (ref 3.5–5.2)
ALLENS TEST: ABNORMAL
ALP SERPL-CCNC: 62 U/L (ref 55–135)
ALP SERPL-CCNC: 62 U/L (ref 55–135)
ALP SERPL-CCNC: 63 U/L (ref 55–135)
ALP SERPL-CCNC: 79 U/L (ref 55–135)
ALT SERPL W/O P-5'-P-CCNC: 105 U/L (ref 10–44)
ALT SERPL W/O P-5'-P-CCNC: 111 U/L (ref 10–44)
ALT SERPL W/O P-5'-P-CCNC: 116 U/L (ref 10–44)
ALT SERPL W/O P-5'-P-CCNC: 125 U/L (ref 10–44)
AMPHET+METHAMPHET UR QL: NEGATIVE
ANION GAP SERPL CALC-SCNC: 14 MMOL/L (ref 8–16)
ANION GAP SERPL CALC-SCNC: 16 MMOL/L (ref 8–16)
AORTIC ROOT ANNULUS: 2.8 CM
AORTIC VALVE CUSP SEPERATION: 1.9 CM
AST SERPL-CCNC: 102 U/L (ref 10–40)
AST SERPL-CCNC: 106 U/L (ref 10–40)
AST SERPL-CCNC: 108 U/L (ref 10–40)
AST SERPL-CCNC: 149 U/L (ref 10–40)
AV INDEX (PROSTH): 0.78
AV MEAN GRADIENT: 1 MMHG
AV PEAK GRADIENT: 1 MMHG
AV VALVE AREA: 2.22 CM2
AV VELOCITY RATIO: 0.9
BACTERIA #/AREA URNS HPF: NEGATIVE /HPF
BACTERIA #/AREA URNS HPF: NEGATIVE /HPF
BARBITURATES UR QL SCN>200 NG/ML: NEGATIVE
BASOPHILS # BLD AUTO: 0.05 K/UL (ref 0–0.2)
BASOPHILS # BLD AUTO: 0.05 K/UL (ref 0–0.2)
BASOPHILS # BLD AUTO: 0.06 K/UL (ref 0–0.2)
BASOPHILS NFR BLD: 0.2 % (ref 0–1.9)
BASOPHILS NFR BLD: 0.3 % (ref 0–1.9)
BASOPHILS NFR BLD: 0.3 % (ref 0–1.9)
BENZODIAZ UR QL SCN>200 NG/ML: NEGATIVE
BILIRUB SERPL-MCNC: 0.7 MG/DL (ref 0.1–1)
BILIRUB SERPL-MCNC: 0.7 MG/DL (ref 0.1–1)
BILIRUB SERPL-MCNC: 1.2 MG/DL (ref 0.1–1)
BILIRUB SERPL-MCNC: 1.3 MG/DL (ref 0.1–1)
BILIRUB UR QL STRIP: NEGATIVE
BILIRUB UR QL STRIP: NEGATIVE
BLD GP AB SCN CELLS X3 SERPL QL: NORMAL
BNP SERPL-MCNC: 576 PG/ML (ref 0–99)
BSA FOR ECHO PROCEDURE: 1.72 M2
BUN SERPL-MCNC: 27 MG/DL (ref 6–20)
BUN SERPL-MCNC: 27 MG/DL (ref 6–20)
BUN SERPL-MCNC: 29 MG/DL (ref 6–20)
BUN SERPL-MCNC: 31 MG/DL (ref 6–20)
BZE UR QL SCN: NEGATIVE
CALCIUM SERPL-MCNC: 8 MG/DL (ref 8.7–10.5)
CALCIUM SERPL-MCNC: 8.2 MG/DL (ref 8.7–10.5)
CALCIUM SERPL-MCNC: 8.3 MG/DL (ref 8.7–10.5)
CALCIUM SERPL-MCNC: 8.9 MG/DL (ref 8.7–10.5)
CANNABINOIDS UR QL SCN: NEGATIVE
CHLORIDE SERPL-SCNC: 108 MMOL/L (ref 95–110)
CHLORIDE SERPL-SCNC: 109 MMOL/L (ref 95–110)
CHLORIDE SERPL-SCNC: 110 MMOL/L (ref 95–110)
CHLORIDE SERPL-SCNC: 111 MMOL/L (ref 95–110)
CLARITY UR: CLEAR
CLARITY UR: CLEAR
CO2 SERPL-SCNC: 11 MMOL/L (ref 23–29)
CO2 SERPL-SCNC: 11 MMOL/L (ref 23–29)
CO2 SERPL-SCNC: 13 MMOL/L (ref 23–29)
CO2 SERPL-SCNC: 17 MMOL/L (ref 23–29)
COLOR UR: YELLOW
COLOR UR: YELLOW
CREAT SERPL-MCNC: 0.9 MG/DL (ref 0.5–1.4)
CREAT SERPL-MCNC: 1 MG/DL (ref 0.5–1.4)
CREAT SERPL-MCNC: 1.2 MG/DL (ref 0.5–1.4)
CREAT SERPL-MCNC: 1.2 MG/DL (ref 0.5–1.4)
CREAT UR-MCNC: 142 MG/DL (ref 15–325)
CV ECHO LV RWT: 0.29 CM
D DIMER PPP IA.FEU-MCNC: 3.33 MG/L FEU
DELSYS: ABNORMAL
DIFFERENTIAL METHOD: ABNORMAL
DOP CALC AO PEAK VEL: 0.51 M/S
DOP CALC AO VTI: 10.6 CM
DOP CALC LVOT AREA: 2.8 CM2
DOP CALC LVOT DIAMETER: 1.9 CM
DOP CALC LVOT PEAK VEL: 0.46 M/S
DOP CALC LVOT STROKE VOLUME: 23.52 CM3
DOP CALC MV VTI: 13.6 CM
DOP CALCLVOT PEAK VEL VTI: 8.3 CM
E WAVE DECELERATION TIME: 223 MSEC
E/A RATIO: 0.91
E/E' RATIO: 7.64 M/S
ECHO LV POSTERIOR WALL: 0.65 CM (ref 0.6–1.1)
EJECTION FRACTION: 25 %
EOSINOPHIL # BLD AUTO: 0.1 K/UL (ref 0–0.5)
EOSINOPHIL # BLD AUTO: 0.1 K/UL (ref 0–0.5)
EOSINOPHIL # BLD AUTO: 1.3 K/UL (ref 0–0.5)
EOSINOPHIL NFR BLD: 0.2 % (ref 0–8)
EOSINOPHIL NFR BLD: 0.4 % (ref 0–8)
EOSINOPHIL NFR BLD: 7.6 % (ref 0–8)
ERYTHROCYTE [DISTWIDTH] IN BLOOD BY AUTOMATED COUNT: 11.9 % (ref 11.5–14.5)
ERYTHROCYTE [DISTWIDTH] IN BLOOD BY AUTOMATED COUNT: 11.9 % (ref 11.5–14.5)
ERYTHROCYTE [DISTWIDTH] IN BLOOD BY AUTOMATED COUNT: 12 % (ref 11.5–14.5)
ERYTHROCYTE [SEDIMENTATION RATE] IN BLOOD BY WESTERGREN METHOD: 3 MM/H
ERYTHROCYTE [SEDIMENTATION RATE] IN BLOOD BY WESTERGREN METHOD: 30 MM/H
EST. GFR  (NO RACE VARIABLE): >60 ML/MIN/1.73 M^2
FIO2: 35
FIO2: 40
FRACTIONAL SHORTENING: 7 % (ref 28–44)
GLUCOSE SERPL-MCNC: 205 MG/DL (ref 70–110)
GLUCOSE SERPL-MCNC: 278 MG/DL (ref 70–110)
GLUCOSE SERPL-MCNC: 307 MG/DL (ref 70–110)
GLUCOSE SERPL-MCNC: 309 MG/DL (ref 70–110)
GLUCOSE SERPL-MCNC: 309 MG/DL (ref 70–110)
GLUCOSE SERPL-MCNC: 325 MG/DL (ref 70–110)
GLUCOSE SERPL-MCNC: 355 MG/DL (ref 70–110)
GLUCOSE SERPL-MCNC: 404 MG/DL (ref 70–110)
GLUCOSE UR QL STRIP: ABNORMAL
GLUCOSE UR QL STRIP: ABNORMAL
HCO3 UR-SCNC: 10.9 MMOL/L (ref 24–28)
HCO3 UR-SCNC: 12.5 MMOL/L (ref 24–28)
HCO3 UR-SCNC: 15.5 MMOL/L (ref 24–28)
HCO3 UR-SCNC: 18 MMOL/L (ref 24–28)
HCT VFR BLD AUTO: 44.9 % (ref 37–48.5)
HCT VFR BLD AUTO: 46.7 % (ref 37–48.5)
HCT VFR BLD AUTO: 48.4 % (ref 37–48.5)
HCT VFR BLD CALC: 49 %PCV (ref 36–54)
HCT VFR BLD CALC: 50 %PCV (ref 36–54)
HGB BLD-MCNC: 16.5 G/DL (ref 12–16)
HGB BLD-MCNC: 16.8 G/DL (ref 12–16)
HGB BLD-MCNC: 16.9 G/DL (ref 12–16)
HGB UR QL STRIP: ABNORMAL
HGB UR QL STRIP: ABNORMAL
HYALINE CASTS #/AREA URNS LPF: 3 /LPF
HYALINE CASTS #/AREA URNS LPF: 3 /LPF
IMM GRANULOCYTES # BLD AUTO: 0.09 K/UL (ref 0–0.04)
IMM GRANULOCYTES # BLD AUTO: 0.15 K/UL (ref 0–0.04)
IMM GRANULOCYTES # BLD AUTO: 0.2 K/UL (ref 0–0.04)
IMM GRANULOCYTES NFR BLD AUTO: 0.5 % (ref 0–0.5)
IMM GRANULOCYTES NFR BLD AUTO: 0.6 % (ref 0–0.5)
IMM GRANULOCYTES NFR BLD AUTO: 0.8 % (ref 0–0.5)
INTERVENTRICULAR SEPTUM: 0.65 CM (ref 0.6–1.1)
IVC DIAMETER: 1.83 CM
KETONES UR QL STRIP: ABNORMAL
KETONES UR QL STRIP: NEGATIVE
LACTATE SERPL-SCNC: 3.8 MMOL/L (ref 0.5–1.9)
LACTATE SERPL-SCNC: 4.2 MMOL/L (ref 0.5–1.9)
LACTATE SERPL-SCNC: 4.3 MMOL/L (ref 0.5–1.9)
LACTATE SERPL-SCNC: 5.5 MMOL/L (ref 0.5–1.9)
LACTATE SERPL-SCNC: 5.7 MMOL/L (ref 0.5–1.9)
LACTATE SERPL-SCNC: 6.1 MMOL/L (ref 0.5–1.9)
LACTATE SERPL-SCNC: 6.5 MMOL/L (ref 0.5–1.9)
LEFT ATRIUM VOLUME INDEX MOD: 11.6 ML/M2
LEFT ATRIUM VOLUME MOD: 20 CM3
LEFT INTERNAL DIMENSION IN SYSTOLE: 4.12 CM (ref 2.1–4)
LEFT VENTRICLE DIASTOLIC VOLUME INDEX: 51.51 ML/M2
LEFT VENTRICLE DIASTOLIC VOLUME: 88.6 ML
LEFT VENTRICLE MASS INDEX: 49 G/M2
LEFT VENTRICLE SYSTOLIC VOLUME INDEX: 43.7 ML/M2
LEFT VENTRICLE SYSTOLIC VOLUME: 75.1 ML
LEFT VENTRICULAR INTERNAL DIMENSION IN DIASTOLE: 4.42 CM (ref 3.5–6)
LEFT VENTRICULAR MASS: 84.46 G
LEUKOCYTE ESTERASE UR QL STRIP: NEGATIVE
LEUKOCYTE ESTERASE UR QL STRIP: NEGATIVE
LV LATERAL E/E' RATIO: 7 M/S
LV SEPTAL E/E' RATIO: 8.4 M/S
LVOT MG: 0 MMHG
LVOT MV: 0.29 CM/S
LYMPHOCYTES # BLD AUTO: 1.2 K/UL (ref 1–4.8)
LYMPHOCYTES # BLD AUTO: 1.5 K/UL (ref 1–4.8)
LYMPHOCYTES # BLD AUTO: 1.5 K/UL (ref 1–4.8)
LYMPHOCYTES NFR BLD: 5.2 % (ref 18–48)
LYMPHOCYTES NFR BLD: 5.8 % (ref 18–48)
LYMPHOCYTES NFR BLD: 8.5 % (ref 18–48)
MAGNESIUM SERPL-MCNC: 2 MG/DL (ref 1.6–2.6)
MAGNESIUM SERPL-MCNC: 2.1 MG/DL (ref 1.6–2.6)
MAGNESIUM SERPL-MCNC: 2.7 MG/DL (ref 1.6–2.6)
MCH RBC QN AUTO: 32.9 PG (ref 27–31)
MCH RBC QN AUTO: 33.1 PG (ref 27–31)
MCH RBC QN AUTO: 33.5 PG (ref 27–31)
MCHC RBC AUTO-ENTMCNC: 34.9 G/DL (ref 32–36)
MCHC RBC AUTO-ENTMCNC: 36 G/DL (ref 32–36)
MCHC RBC AUTO-ENTMCNC: 36.7 G/DL (ref 32–36)
MCV RBC AUTO: 90 FL (ref 82–98)
MCV RBC AUTO: 93 FL (ref 82–98)
MCV RBC AUTO: 94 FL (ref 82–98)
MICROSCOPIC COMMENT: ABNORMAL
MICROSCOPIC COMMENT: ABNORMAL
MIN VOL: 15
MIN VOL: 16.6
MODE: ABNORMAL
MONOCYTES # BLD AUTO: 0.8 K/UL (ref 0.3–1)
MONOCYTES # BLD AUTO: 1.3 K/UL (ref 0.3–1)
MONOCYTES # BLD AUTO: 1.3 K/UL (ref 0.3–1)
MONOCYTES NFR BLD: 4.7 % (ref 4–15)
MONOCYTES NFR BLD: 4.9 % (ref 4–15)
MONOCYTES NFR BLD: 5.6 % (ref 4–15)
MRSA SCREEN BY PCR: NEGATIVE
MV MEAN GRADIENT: 0 MMHG
MV PEAK A VEL: 0.46 M/S
MV PEAK E VEL: 0.42 M/S
MV PEAK GRADIENT: 1 MMHG
MV STENOSIS PRESSURE HALF TIME: 70 MS
MV VALVE AREA BY CONTINUITY EQUATION: 1.73 CM2
MV VALVE AREA P 1/2 METHOD: 3.14 CM2
NEUTROPHILS # BLD AUTO: 13.3 K/UL (ref 1.8–7.7)
NEUTROPHILS # BLD AUTO: 20.6 K/UL (ref 1.8–7.7)
NEUTROPHILS # BLD AUTO: 22.2 K/UL (ref 1.8–7.7)
NEUTROPHILS NFR BLD: 78.4 % (ref 38–73)
NEUTROPHILS NFR BLD: 87.9 % (ref 38–73)
NEUTROPHILS NFR BLD: 88.1 % (ref 38–73)
NITRITE UR QL STRIP: NEGATIVE
NITRITE UR QL STRIP: NEGATIVE
NRBC BLD-RTO: 0 /100 WBC
OPIATES UR QL SCN: NEGATIVE
PCO2 BLDA: 24.1 MMHG (ref 35–45)
PCO2 BLDA: 24.9 MMHG (ref 35–45)
PCO2 BLDA: 26.8 MMHG (ref 35–45)
PCO2 BLDA: 33.3 MMHG (ref 35–45)
PCP UR QL SCN>25 NG/ML: NEGATIVE
PEEP: 5
PH SMN: 7.25 [PH] (ref 7.35–7.45)
PH SMN: 7.32 [PH] (ref 7.35–7.45)
PH SMN: 7.34 [PH] (ref 7.35–7.45)
PH SMN: 7.37 [PH] (ref 7.35–7.45)
PH UR STRIP: 5 [PH] (ref 5–8)
PH UR STRIP: 7 [PH] (ref 5–8)
PHOSPHATE SERPL-MCNC: 1.5 MG/DL (ref 2.7–4.5)
PHOSPHATE SERPL-MCNC: 3.2 MG/DL (ref 2.7–4.5)
PHOSPHATE SERPL-MCNC: <1 MG/DL (ref 2.7–4.5)
PIP: 23
PIP: 29
PLATELET # BLD AUTO: 220 K/UL (ref 150–450)
PLATELET # BLD AUTO: 235 K/UL (ref 150–450)
PLATELET # BLD AUTO: 247 K/UL (ref 150–450)
PLATELET BLD QL SMEAR: ABNORMAL
PMV BLD AUTO: 10.3 FL (ref 9.2–12.9)
PMV BLD AUTO: 10.6 FL (ref 9.2–12.9)
PMV BLD AUTO: 10.6 FL (ref 9.2–12.9)
PO2 BLDA: 191 MMHG (ref 80–100)
PO2 BLDA: 214 MMHG (ref 80–100)
PO2 BLDA: 227 MMHG (ref 80–100)
PO2 BLDA: 45 MMHG (ref 40–60)
POC BE: -10 MMOL/L
POC BE: -14 MMOL/L
POC BE: -16 MMOL/L
POC BE: -8 MMOL/L
POC IONIZED CALCIUM: 1.22 MMOL/L (ref 1.06–1.42)
POC IONIZED CALCIUM: 1.25 MMOL/L (ref 1.06–1.42)
POC SATURATED O2: 100 % (ref 95–100)
POC SATURATED O2: 78 % (ref 95–100)
POC TCO2: 12 MMOL/L (ref 23–27)
POC TCO2: 13 MMOL/L (ref 23–27)
POC TCO2: 16 MMOL/L (ref 23–27)
POC TCO2: 19 MMOL/L (ref 24–29)
POTASSIUM BLD-SCNC: 2.8 MMOL/L (ref 3.5–5.1)
POTASSIUM BLD-SCNC: 2.8 MMOL/L (ref 3.5–5.1)
POTASSIUM SERPL-SCNC: 2.3 MMOL/L (ref 3.5–5.1)
POTASSIUM SERPL-SCNC: 2.9 MMOL/L (ref 3.5–5.1)
POTASSIUM SERPL-SCNC: 3.2 MMOL/L (ref 3.5–5.1)
POTASSIUM SERPL-SCNC: 3.5 MMOL/L (ref 3.5–5.1)
PROCALCITONIN SERPL IA-MCNC: 0.08 NG/ML (ref 0–0.5)
PROCALCITONIN SERPL IA-MCNC: 1.71 NG/ML (ref 0–0.5)
PROT SERPL-MCNC: 6.2 G/DL (ref 6–8.4)
PROT SERPL-MCNC: 6.8 G/DL (ref 6–8.4)
PROT SERPL-MCNC: 6.9 G/DL (ref 6–8.4)
PROT SERPL-MCNC: 7 G/DL (ref 6–8.4)
PROT UR QL STRIP: ABNORMAL
PROT UR QL STRIP: ABNORMAL
PV MV: 0.4 M/S
PV PEAK VELOCITY: 0.61 CM/S
RBC # BLD AUTO: 4.98 M/UL (ref 4–5.4)
RBC # BLD AUTO: 5.02 M/UL (ref 4–5.4)
RBC # BLD AUTO: 5.13 M/UL (ref 4–5.4)
RBC #/AREA URNS HPF: 1 /HPF (ref 0–4)
RBC #/AREA URNS HPF: 10 /HPF (ref 0–4)
RV TISSUE DOPPLER FREE WALL SYSTOLIC VELOCITY 1 (APICAL 4 CHAMBER VIEW): 0.01 CM/S
SAMPLE: ABNORMAL
SITE: ABNORMAL
SODIUM BLD-SCNC: 139 MMOL/L (ref 136–145)
SODIUM BLD-SCNC: 142 MMOL/L (ref 136–145)
SODIUM SERPL-SCNC: 134 MMOL/L (ref 136–145)
SODIUM SERPL-SCNC: 137 MMOL/L (ref 136–145)
SODIUM SERPL-SCNC: 138 MMOL/L (ref 136–145)
SODIUM SERPL-SCNC: 139 MMOL/L (ref 136–145)
SP GR UR STRIP: 1.02 (ref 1–1.03)
SP GR UR STRIP: >1.03 (ref 1–1.03)
SP02: 100
SP02: 100
SQUAMOUS #/AREA URNS HPF: 3 /HPF
SQUAMOUS #/AREA URNS HPF: 6 /HPF
TDI LATERAL: 0.06 M/S
TDI SEPTAL: 0.05 M/S
TDI: 0.06 M/S
TOXICOLOGY INFORMATION: NORMAL
TRICUSPID ANNULAR PLANE SYSTOLIC EXCURSION: 1.28 CM
TROPONIN I SERPL HS-MCNC: 2112.4 PG/ML (ref 0–14.9)
TROPONIN I SERPL HS-MCNC: 2161.6 PG/ML (ref 0–14.9)
TROPONIN I SERPL HS-MCNC: 630.6 PG/ML (ref 0–14.9)
TROPONIN I SERPL HS-MCNC: 630.6 PG/ML (ref 0–14.9)
URN SPEC COLLECT METH UR: ABNORMAL
URN SPEC COLLECT METH UR: ABNORMAL
UROBILINOGEN UR STRIP-ACNC: NEGATIVE EU/DL
UROBILINOGEN UR STRIP-ACNC: NEGATIVE EU/DL
VIT B12 SERPL-MCNC: 226 PG/ML (ref 210–950)
VT: 500
WBC # BLD AUTO: 17.01 K/UL (ref 3.9–12.7)
WBC # BLD AUTO: 23.38 K/UL (ref 3.9–12.7)
WBC # BLD AUTO: 25.28 K/UL (ref 3.9–12.7)
WBC #/AREA URNS HPF: 10 /HPF (ref 0–5)
WBC #/AREA URNS HPF: 3 /HPF (ref 0–5)
YEAST URNS QL MICRO: ABNORMAL

## 2023-03-18 PROCEDURE — 93306 TTE W/DOPPLER COMPLETE: CPT | Mod: 26,,, | Performed by: INTERNAL MEDICINE

## 2023-03-18 PROCEDURE — 36415 COLL VENOUS BLD VENIPUNCTURE: CPT | Performed by: INTERNAL MEDICINE

## 2023-03-18 PROCEDURE — C1751 CATH, INF, PER/CENT/MIDLINE: HCPCS

## 2023-03-18 PROCEDURE — 25000003 PHARM REV CODE 250: Performed by: INTERNAL MEDICINE

## 2023-03-18 PROCEDURE — 37799 UNLISTED PX VASCULAR SURGERY: CPT

## 2023-03-18 PROCEDURE — 99223 PR INITIAL HOSPITAL CARE,LEVL III: ICD-10-PCS | Mod: 25,,, | Performed by: INTERNAL MEDICINE

## 2023-03-18 PROCEDURE — 63600175 PHARM REV CODE 636 W HCPCS: Performed by: INTERNAL MEDICINE

## 2023-03-18 PROCEDURE — 27000221 HC OXYGEN, UP TO 24 HOURS

## 2023-03-18 PROCEDURE — 82803 BLOOD GASES ANY COMBINATION: CPT

## 2023-03-18 PROCEDURE — 36620 INSERTION CATHETER ARTERY: CPT | Mod: ,,, | Performed by: ANESTHESIOLOGY

## 2023-03-18 PROCEDURE — 87641 MR-STAPH DNA AMP PROBE: CPT | Performed by: INTERNAL MEDICINE

## 2023-03-18 PROCEDURE — 36556 INSERT NON-TUNNEL CV CATH: CPT

## 2023-03-18 PROCEDURE — 25500020 PHARM REV CODE 255: Performed by: INTERNAL MEDICINE

## 2023-03-18 PROCEDURE — 80307 DRUG TEST PRSMV CHEM ANLYZR: CPT | Performed by: INTERNAL MEDICINE

## 2023-03-18 PROCEDURE — 80053 COMPREHEN METABOLIC PANEL: CPT | Performed by: INTERNAL MEDICINE

## 2023-03-18 PROCEDURE — 99900031 HC PATIENT EDUCATION (STAT)

## 2023-03-18 PROCEDURE — 99199 UNLISTED SPECIAL SVC PX/RPRT: CPT

## 2023-03-18 PROCEDURE — 81001 URINALYSIS AUTO W/SCOPE: CPT | Performed by: INTERNAL MEDICINE

## 2023-03-18 PROCEDURE — P9047 ALBUMIN (HUMAN), 25%, 50ML: HCPCS | Mod: JZ,JG | Performed by: INTERNAL MEDICINE

## 2023-03-18 PROCEDURE — 87522 HEPATITIS C REVRS TRNSCRPJ: CPT | Performed by: INTERNAL MEDICINE

## 2023-03-18 PROCEDURE — 95812 EEG 41-60 MINUTES: CPT

## 2023-03-18 PROCEDURE — 95711 VEEG 2-12 HR UNMONITORED: CPT

## 2023-03-18 PROCEDURE — 20000000 HC ICU ROOM

## 2023-03-18 PROCEDURE — 84295 ASSAY OF SERUM SODIUM: CPT

## 2023-03-18 PROCEDURE — 94002 VENT MGMT INPAT INIT DAY: CPT

## 2023-03-18 PROCEDURE — 99900026 HC AIRWAY MAINTENANCE (STAT)

## 2023-03-18 PROCEDURE — 85014 HEMATOCRIT: CPT

## 2023-03-18 PROCEDURE — 63600175 PHARM REV CODE 636 W HCPCS: Performed by: EMERGENCY MEDICINE

## 2023-03-18 PROCEDURE — 25000242 PHARM REV CODE 250 ALT 637 W/ HCPCS: Performed by: INTERNAL MEDICINE

## 2023-03-18 PROCEDURE — 84100 ASSAY OF PHOSPHORUS: CPT | Mod: 91 | Performed by: INTERNAL MEDICINE

## 2023-03-18 PROCEDURE — 80053 COMPREHEN METABOLIC PANEL: CPT | Mod: 91 | Performed by: INTERNAL MEDICINE

## 2023-03-18 PROCEDURE — 25000003 PHARM REV CODE 250

## 2023-03-18 PROCEDURE — 94799 UNLISTED PULMONARY SVC/PX: CPT

## 2023-03-18 PROCEDURE — 80307 DRUG TEST PRSMV CHEM ANLYZR: CPT | Mod: 91 | Performed by: INTERNAL MEDICINE

## 2023-03-18 PROCEDURE — 36556 INSERT NON-TUNNEL CV CATH: CPT | Mod: ,,, | Performed by: ANESTHESIOLOGY

## 2023-03-18 PROCEDURE — C9113 INJ PANTOPRAZOLE SODIUM, VIA: HCPCS | Performed by: INTERNAL MEDICINE

## 2023-03-18 PROCEDURE — 99223 1ST HOSP IP/OBS HIGH 75: CPT | Mod: 25,,, | Performed by: INTERNAL MEDICINE

## 2023-03-18 PROCEDURE — 84484 ASSAY OF TROPONIN QUANT: CPT | Mod: 91 | Performed by: INTERNAL MEDICINE

## 2023-03-18 PROCEDURE — 87389 HIV-1 AG W/HIV-1&-2 AB AG IA: CPT | Performed by: INTERNAL MEDICINE

## 2023-03-18 PROCEDURE — 36620 INSERTION CATHETER ARTERY: CPT

## 2023-03-18 PROCEDURE — 84132 ASSAY OF SERUM POTASSIUM: CPT

## 2023-03-18 PROCEDURE — 95813 EEG EXTND MNTR 61-119 MIN: CPT

## 2023-03-18 PROCEDURE — 82330 ASSAY OF CALCIUM: CPT

## 2023-03-18 PROCEDURE — 95819 EEG AWAKE AND ASLEEP: CPT

## 2023-03-18 PROCEDURE — 99900035 HC TECH TIME PER 15 MIN (STAT)

## 2023-03-18 PROCEDURE — 84145 PROCALCITONIN (PCT): CPT | Performed by: INTERNAL MEDICINE

## 2023-03-18 PROCEDURE — 85025 COMPLETE CBC W/AUTO DIFF WBC: CPT | Performed by: INTERNAL MEDICINE

## 2023-03-18 PROCEDURE — 84484 ASSAY OF TROPONIN QUANT: CPT | Performed by: EMERGENCY MEDICINE

## 2023-03-18 PROCEDURE — 80074 ACUTE HEPATITIS PANEL: CPT | Performed by: INTERNAL MEDICINE

## 2023-03-18 PROCEDURE — 83605 ASSAY OF LACTIC ACID: CPT | Mod: 91 | Performed by: INTERNAL MEDICINE

## 2023-03-18 PROCEDURE — 36415 COLL VENOUS BLD VENIPUNCTURE: CPT | Performed by: EMERGENCY MEDICINE

## 2023-03-18 PROCEDURE — 83880 ASSAY OF NATRIURETIC PEPTIDE: CPT | Performed by: INTERNAL MEDICINE

## 2023-03-18 PROCEDURE — 83735 ASSAY OF MAGNESIUM: CPT | Performed by: INTERNAL MEDICINE

## 2023-03-18 PROCEDURE — 93306 TTE W/DOPPLER COMPLETE: CPT

## 2023-03-18 PROCEDURE — 82607 VITAMIN B-12: CPT | Performed by: EMERGENCY MEDICINE

## 2023-03-18 PROCEDURE — 94003 VENT MGMT INPAT SUBQ DAY: CPT

## 2023-03-18 PROCEDURE — 93306 ECHO (CUPID ONLY): ICD-10-PCS | Mod: 26,,, | Performed by: INTERNAL MEDICINE

## 2023-03-18 PROCEDURE — 36620 ARTERIAL: ICD-10-PCS | Mod: ,,, | Performed by: ANESTHESIOLOGY

## 2023-03-18 PROCEDURE — 95714 VEEG EA 12-26 HR UNMNTR: CPT

## 2023-03-18 PROCEDURE — 95700 EEG CONT REC W/VID EEG TECH: CPT

## 2023-03-18 PROCEDURE — 94761 N-INVAS EAR/PLS OXIMETRY MLT: CPT

## 2023-03-18 PROCEDURE — 36556 CENTRAL LINE: ICD-10-PCS | Mod: ,,, | Performed by: ANESTHESIOLOGY

## 2023-03-18 RX ORDER — INSULIN ASPART 100 [IU]/ML
0-5 INJECTION, SOLUTION INTRAVENOUS; SUBCUTANEOUS EVERY 6 HOURS PRN
Status: DISCONTINUED | OUTPATIENT
Start: 2023-03-18 | End: 2023-03-20 | Stop reason: HOSPADM

## 2023-03-18 RX ORDER — ALBUMIN HUMAN 250 G/1000ML
12.5 SOLUTION INTRAVENOUS ONCE
Status: COMPLETED | OUTPATIENT
Start: 2023-03-18 | End: 2023-03-18

## 2023-03-18 RX ORDER — CALCIUM GLUCONATE 20 MG/ML
1 INJECTION, SOLUTION INTRAVENOUS
Status: DISCONTINUED | OUTPATIENT
Start: 2023-03-18 | End: 2023-03-20 | Stop reason: HOSPADM

## 2023-03-18 RX ORDER — DEXTROSE, SODIUM CHLORIDE, SODIUM LACTATE, POTASSIUM CHLORIDE, AND CALCIUM CHLORIDE 5; .6; .31; .03; .02 G/100ML; G/100ML; G/100ML; G/100ML; G/100ML
INJECTION, SOLUTION INTRAVENOUS CONTINUOUS
Status: DISCONTINUED | OUTPATIENT
Start: 2023-03-18 | End: 2023-03-18

## 2023-03-18 RX ORDER — PANTOPRAZOLE SODIUM 40 MG/10ML
40 INJECTION, POWDER, LYOPHILIZED, FOR SOLUTION INTRAVENOUS DAILY
Status: DISCONTINUED | OUTPATIENT
Start: 2023-03-18 | End: 2023-03-20 | Stop reason: HOSPADM

## 2023-03-18 RX ORDER — MAGNESIUM SULFATE HEPTAHYDRATE 40 MG/ML
0.5 INJECTION, SOLUTION INTRAVENOUS CONTINUOUS
Status: DISCONTINUED | OUTPATIENT
Start: 2023-03-18 | End: 2023-03-18

## 2023-03-18 RX ORDER — POTASSIUM CHLORIDE 14.9 MG/ML
60 INJECTION INTRAVENOUS
Status: DISCONTINUED | OUTPATIENT
Start: 2023-03-18 | End: 2023-03-20 | Stop reason: HOSPADM

## 2023-03-18 RX ORDER — CALCIUM GLUCONATE 20 MG/ML
2 INJECTION, SOLUTION INTRAVENOUS
Status: DISCONTINUED | OUTPATIENT
Start: 2023-03-18 | End: 2023-03-20 | Stop reason: HOSPADM

## 2023-03-18 RX ORDER — SODIUM BICARBONATE 1 MEQ/ML
100 SYRINGE (ML) INTRAVENOUS ONCE
Status: DISCONTINUED | OUTPATIENT
Start: 2023-03-18 | End: 2023-03-18

## 2023-03-18 RX ORDER — POTASSIUM CHLORIDE 20 MEQ/1
60 TABLET, EXTENDED RELEASE ORAL ONCE
Status: DISCONTINUED | OUTPATIENT
Start: 2023-03-18 | End: 2023-03-20 | Stop reason: HOSPADM

## 2023-03-18 RX ORDER — MAGNESIUM SULFATE HEPTAHYDRATE 40 MG/ML
2 INJECTION, SOLUTION INTRAVENOUS
Status: DISCONTINUED | OUTPATIENT
Start: 2023-03-18 | End: 2023-03-20 | Stop reason: HOSPADM

## 2023-03-18 RX ORDER — SODIUM BICARBONATE 1 MEQ/ML
100 SYRINGE (ML) INTRAVENOUS ONCE
Status: COMPLETED | OUTPATIENT
Start: 2023-03-18 | End: 2023-03-18

## 2023-03-18 RX ORDER — POTASSIUM CHLORIDE 29.8 MG/ML
40 INJECTION INTRAVENOUS
Status: DISCONTINUED | OUTPATIENT
Start: 2023-03-18 | End: 2023-03-20 | Stop reason: HOSPADM

## 2023-03-18 RX ORDER — HYDRALAZINE HYDROCHLORIDE 20 MG/ML
10 INJECTION INTRAMUSCULAR; INTRAVENOUS EVERY 4 HOURS PRN
Status: DISCONTINUED | OUTPATIENT
Start: 2023-03-18 | End: 2023-03-20 | Stop reason: HOSPADM

## 2023-03-18 RX ORDER — CEFEPIME HYDROCHLORIDE 1 G/50ML
2 INJECTION, SOLUTION INTRAVENOUS
Status: DISCONTINUED | OUTPATIENT
Start: 2023-03-18 | End: 2023-03-20 | Stop reason: HOSPADM

## 2023-03-18 RX ORDER — ENOXAPARIN SODIUM 100 MG/ML
40 INJECTION SUBCUTANEOUS EVERY 24 HOURS
Status: DISCONTINUED | OUTPATIENT
Start: 2023-03-18 | End: 2023-03-20 | Stop reason: HOSPADM

## 2023-03-18 RX ORDER — METOPROLOL TARTRATE 1 MG/ML
5 INJECTION, SOLUTION INTRAVENOUS EVERY 6 HOURS
Status: DISCONTINUED | OUTPATIENT
Start: 2023-03-18 | End: 2023-03-18

## 2023-03-18 RX ORDER — SODIUM CHLORIDE 9 MG/ML
INJECTION, SOLUTION INTRAVENOUS CONTINUOUS
Status: DISCONTINUED | OUTPATIENT
Start: 2023-03-18 | End: 2023-03-18

## 2023-03-18 RX ORDER — MUPIROCIN 20 MG/G
OINTMENT TOPICAL 2 TIMES DAILY
Status: DISCONTINUED | OUTPATIENT
Start: 2023-03-18 | End: 2023-03-20 | Stop reason: HOSPADM

## 2023-03-18 RX ORDER — MAGNESIUM SULFATE HEPTAHYDRATE 40 MG/ML
4 INJECTION, SOLUTION INTRAVENOUS
Status: DISCONTINUED | OUTPATIENT
Start: 2023-03-18 | End: 2023-03-20 | Stop reason: HOSPADM

## 2023-03-18 RX ORDER — ACETYLCYSTEINE 200 MG/ML
600 SOLUTION ORAL; RESPIRATORY (INHALATION) 2 TIMES DAILY
Status: DISCONTINUED | OUTPATIENT
Start: 2023-03-18 | End: 2023-03-18

## 2023-03-18 RX ORDER — CHLORHEXIDINE GLUCONATE ORAL RINSE 1.2 MG/ML
15 SOLUTION DENTAL 2 TIMES DAILY
Status: DISCONTINUED | OUTPATIENT
Start: 2023-03-18 | End: 2023-03-20 | Stop reason: HOSPADM

## 2023-03-18 RX ORDER — CALCIUM GLUCONATE 20 MG/ML
3 INJECTION, SOLUTION INTRAVENOUS
Status: DISCONTINUED | OUTPATIENT
Start: 2023-03-18 | End: 2023-03-20 | Stop reason: HOSPADM

## 2023-03-18 RX ORDER — DOBUTAMINE HYDROCHLORIDE 400 MG/100ML
2.5 INJECTION INTRAVENOUS CONTINUOUS
Status: DISCONTINUED | OUTPATIENT
Start: 2023-03-18 | End: 2023-03-18

## 2023-03-18 RX ORDER — POTASSIUM CHLORIDE 29.8 MG/ML
80 INJECTION INTRAVENOUS
Status: DISCONTINUED | OUTPATIENT
Start: 2023-03-18 | End: 2023-03-20 | Stop reason: HOSPADM

## 2023-03-18 RX ORDER — METOPROLOL TARTRATE 1 MG/ML
2.5 INJECTION, SOLUTION INTRAVENOUS EVERY 6 HOURS
Status: DISCONTINUED | OUTPATIENT
Start: 2023-03-18 | End: 2023-03-18

## 2023-03-18 RX ORDER — GLUCAGON 1 MG
1 KIT INJECTION
Status: DISCONTINUED | OUTPATIENT
Start: 2023-03-18 | End: 2023-03-20 | Stop reason: HOSPADM

## 2023-03-18 RX ORDER — ACETYLCYSTEINE 200 MG/ML
600 SOLUTION ORAL; RESPIRATORY (INHALATION) 2 TIMES DAILY
Status: COMPLETED | OUTPATIENT
Start: 2023-03-18 | End: 2023-03-19

## 2023-03-18 RX ORDER — FENTANYL CITRATE-0.9 % NACL/PF 10 MCG/ML
0-300 PLASTIC BAG, INJECTION (ML) INTRAVENOUS CONTINUOUS
Status: DISCONTINUED | OUTPATIENT
Start: 2023-03-18 | End: 2023-03-20 | Stop reason: HOSPADM

## 2023-03-18 RX ORDER — POTASSIUM CHLORIDE 20 MEQ/1
40 TABLET, EXTENDED RELEASE ORAL ONCE
Status: DISCONTINUED | OUTPATIENT
Start: 2023-03-18 | End: 2023-03-18

## 2023-03-18 RX ORDER — POTASSIUM CHLORIDE 7.45 MG/ML
10 INJECTION INTRAVENOUS ONCE
Status: COMPLETED | OUTPATIENT
Start: 2023-03-18 | End: 2023-03-18

## 2023-03-18 RX ADMIN — INSULIN ASPART 3 UNITS: 100 INJECTION, SOLUTION INTRAVENOUS; SUBCUTANEOUS at 06:03

## 2023-03-18 RX ADMIN — SODIUM CHLORIDE 500 ML: 0.9 INJECTION, SOLUTION INTRAVENOUS at 07:03

## 2023-03-18 RX ADMIN — SODIUM BICARBONATE: 84 INJECTION, SOLUTION INTRAVENOUS at 11:03

## 2023-03-18 RX ADMIN — FENTANYL CITRATE 50 MCG/HR: 50 INJECTION, SOLUTION INTRAMUSCULAR; INTRAVENOUS at 12:03

## 2023-03-18 RX ADMIN — CHLORHEXIDINE GLUCONATE 15 ML: 1.2 RINSE ORAL at 11:03

## 2023-03-18 RX ADMIN — ENOXAPARIN SODIUM 40 MG: 100 INJECTION SUBCUTANEOUS at 01:03

## 2023-03-18 RX ADMIN — ALBUMIN (HUMAN) 12.5 G: 12.5 SOLUTION INTRAVENOUS at 07:03

## 2023-03-18 RX ADMIN — ENOXAPARIN SODIUM 40 MG: 100 INJECTION SUBCUTANEOUS at 04:03

## 2023-03-18 RX ADMIN — ACETYLCYSTEINE 600 MG: 200 SOLUTION ORAL; RESPIRATORY (INHALATION) at 09:03

## 2023-03-18 RX ADMIN — CHLORHEXIDINE GLUCONATE 15 ML: 1.2 RINSE ORAL at 09:03

## 2023-03-18 RX ADMIN — SODIUM CHLORIDE 1000 ML: 0.9 INJECTION, SOLUTION INTRAVENOUS at 08:03

## 2023-03-18 RX ADMIN — SODIUM CHLORIDE: 0.9 INJECTION, SOLUTION INTRAVENOUS at 06:03

## 2023-03-18 RX ADMIN — METOPROLOL TARTRATE 5 MG: 1 INJECTION, SOLUTION INTRAVENOUS at 12:03

## 2023-03-18 RX ADMIN — SODIUM PHOSPHATE, MONOBASIC, MONOHYDRATE AND SODIUM PHOSPHATE, DIBASIC, ANHYDROUS 30 MMOL: 142; 276 INJECTION, SOLUTION INTRAVENOUS at 10:03

## 2023-03-18 RX ADMIN — SODIUM BICARBONATE: 84 INJECTION, SOLUTION INTRAVENOUS at 01:03

## 2023-03-18 RX ADMIN — CEFEPIME HYDROCHLORIDE 2 G: 2 INJECTION, SOLUTION INTRAVENOUS at 04:03

## 2023-03-18 RX ADMIN — POTASSIUM CHLORIDE 10 MEQ: 7.46 INJECTION, SOLUTION INTRAVENOUS at 06:03

## 2023-03-18 RX ADMIN — POTASSIUM CHLORIDE 80 MEQ: 29.8 INJECTION, SOLUTION INTRAVENOUS at 11:03

## 2023-03-18 RX ADMIN — INSULIN ASPART 4 UNITS: 100 INJECTION, SOLUTION INTRAVENOUS; SUBCUTANEOUS at 01:03

## 2023-03-18 RX ADMIN — MUPIROCIN 1 G: 20 OINTMENT TOPICAL at 09:03

## 2023-03-18 RX ADMIN — VANCOMYCIN HYDROCHLORIDE 1750 MG: 500 INJECTION, POWDER, LYOPHILIZED, FOR SOLUTION INTRAVENOUS at 08:03

## 2023-03-18 RX ADMIN — CEFEPIME HYDROCHLORIDE 2 G: 2 INJECTION, SOLUTION INTRAVENOUS at 10:03

## 2023-03-18 RX ADMIN — MUPIROCIN 1 G: 20 OINTMENT TOPICAL at 11:03

## 2023-03-18 RX ADMIN — CEFEPIME HYDROCHLORIDE 2 G: 2 INJECTION, SOLUTION INTRAVENOUS at 08:03

## 2023-03-18 RX ADMIN — ACETYLCYSTEINE 600 MG: 200 SOLUTION ORAL; RESPIRATORY (INHALATION) at 02:03

## 2023-03-18 RX ADMIN — SODIUM BICARBONATE 100 MEQ: 84 INJECTION, SOLUTION INTRAVENOUS at 01:03

## 2023-03-18 RX ADMIN — THIAMINE HYDROCHLORIDE 100 MG: 100 INJECTION, SOLUTION INTRAMUSCULAR; INTRAVENOUS at 04:03

## 2023-03-18 RX ADMIN — IOHEXOL 100 ML: 350 INJECTION, SOLUTION INTRAVENOUS at 12:03

## 2023-03-18 RX ADMIN — PANTOPRAZOLE SODIUM 40 MG: 40 INJECTION, POWDER, FOR SOLUTION INTRAVENOUS at 08:03

## 2023-03-18 RX ADMIN — PROPOFOL 40 MCG/KG/MIN: 10 INJECTION, EMULSION INTRAVENOUS at 08:03

## 2023-03-18 RX ADMIN — SODIUM CHLORIDE 500 ML: 0.9 INJECTION, SOLUTION INTRAVENOUS at 09:03

## 2023-03-18 RX ADMIN — PROPOFOL 50 MCG/KG/MIN: 10 INJECTION, EMULSION INTRAVENOUS at 01:03

## 2023-03-18 RX ADMIN — PIPERACILLIN SODIUM AND TAZOBACTAM SODIUM 3.38 G: 3; .375 INJECTION, POWDER, LYOPHILIZED, FOR SOLUTION INTRAVENOUS at 03:03

## 2023-03-18 NOTE — PLAN OF CARE
[facility]  Initial Discharge Assessment       Primary Care Provider: Primary Doctor No    Admission Diagnosis: Chest pain [R07.9]    Admission Date: 3/17/2023  Expected Discharge Date:     Met patient at bedside.  Patient on vent.  Spoke with patient mother.  Mother reported that the patient was currently living with her boy friend.  The address listed on the face sheet is the mothers address.  Per the mother the patient is comatose and they are planning for organ donation.        Discharge Barriers Identified: None    Payor: BLUE CROSS BLUE [employer]IELD / Plan: [place] [place] PPO / Product Type: PPO /     Extended Emergency Contact Information  Primary Emergency Contact: Jose Roberto Jean-Baptiste  Home Phone: [phone]  Mobile Phone: [phone]  Relation: Friend  Preferred language: English  Secondary Emergency Contact: Kristen Ruelas  Address: [address] [place] [address] [id number] [place] [address]  Home Phone: [phone]  Mobile Phone: [phone]  Relation: Mother  Preferred language: English   needed? No    Discharge Plan A: Other (TBD)  Discharge Plan B: Other (TBD)      [facility] ST[facility] #[id number] - [facility] [id number] [place] [address] 25 AT [facility] [address]   [id number] [place] [id number]  Phone: [phone] Fax: [phone]      Initial Assessment (most recent)       Adult Discharge Assessment - 03/18/23 1401          Discharge Assessment    Assessment Type Discharge Planning Assessment     Confirmed/corrected address, phone number and insurance Yes     Confirmed Demographics Correct on Facesheet     Source of Information family     If unable to respond/provide information was family/caregiver contacted? No Contact Information Available     Does patient/caregiver understand observation status --   n/a    Communicated KANDY with patient/caregiver Date not available/Unable to determine     Reason For Admission Cardiac arrest     People in Home significant other      Facility Arrived From: home     Do you expect to return to your current living situation? Other (see comments)   TBD    Do you have help at home or someone to help you manage your care at home? Yes     Who are your caregiver(s) and their phone number(s)? Jose Roberto Jean-Baptiste (boy friend) [phone]     Prior to hospitilization cognitive status: Unable to Assess     Current cognitive status: Unable to Assess     Equipment Currently Used at Home none     Patient currently being followed by outpatient case management? No     Do you currently have service(s) that help you manage your care at home? No     Do you have prescription coverage? Yes     Coverage Payor:  [facility]     Do you have any problems affording any of your prescribed medications? TBD     Who is going to help you get home at discharge? TBD     How do you get to doctors appointments? car, drives self;family or friend will provide     Are you on dialysis? No     Do you take coumadin? No     Discharge Plan A Other   TBD    Discharge Plan B Other   TBD    DME Needed Upon Discharge  other (see comments)   TBD    Discharge Plan discussed with: Parent(s)     Name(s) and Number(s) Kristen Ruelas (mother) [phone]     Discharge Barriers Identified None

## 2023-03-18 NOTE — CONSULTS
Pharmacokinetic Initial Assessment: IV Vancomycin    Assessment/Plan:    Initiate intravenous vancomycin with loading dose of 1750 mg once followed by a maintenance dose of vancomycin 1250 mg IV every 24 hours  Desired empiric serum trough concentration is 15 to 20 mcg/mL  Draw vancomycin trough level 60 min prior to third dose on 03/20/23 at approximately 0700  Pharmacy will continue to follow and monitor vancomycin.      Please contact pharmacy at extension [phone] with any questions regarding this assessment.     Thank you for the consult,   Iraida Do, PharmD       Patient brief summary:  Mishel Ruelas is a 27 y.o. female initiated on antimicrobial therapy with IV Vancomycin for treatment of suspected sepsis    Drug Allergies:   Review of patient's allergies indicates:  No Known Allergies    Actual Body Weight:   65 kg    Renal Function:   Estimated Creatinine Clearance: 64.5 mL/min (based on SCr of 1.2 mg/dL).,     Dialysis Method (if applicable):  N/A    CBC (last 72 hours):  Recent Labs   Lab Result Units 03/17/23 2137 03/18/23  0616   WBC K/uL 10.89 23.38*   Hemoglobin g/dL 14.2 16.9*   Hematocrit % 43.1 48.4   Platelets K/uL 252 247   Gran % % 50.6 88.1*   Lymph % % 42.8 5.2*   Mono % % 3.0* 5.6   Eosinophil % % 0.4 0.2   Basophil % % 0.6 0.3   Differential Method  Automated Automated       Metabolic Panel (last 72 hours):  Recent Labs   Lab Result Units 03/17/23 2137 03/17/23  2250 03/17/23  2334 03/18/23  0052 03/18/23  0616   Sodium mmol/L 136  --   --   --  138   Potassium mmol/L 7.3*  --   --   --  2.3*   Chloride mmol/L 103  --   --   --  111*   CO2 mmol/L 19*  --   --   --  11*   Glucose mg/dL 216*  --   --   --  355*   Glucose, UA   --  Negative 1+*  --   --    BUN mg/dL 19  --   --   --  31*   Creatinine mg/dL 1.2  --   --   --  1.2   Creatinine, Urine mg/dL  --  141.0  --  142.0  --    Albumin g/dL 3.8  --   --   --  3.9   Total Bilirubin mg/dL 0.6  --   --   --  1.2*   Alkaline  Phosphatase U/L 91  --   --   --  79   AST U/L 188*  --   --   --  149*   ALT U/L 75*  --   --   --  125*   Magnesium mg/dL 2.5  --   --   --  2.7*   Phosphorus mg/dL  --   --   --   --  <1.0*       Drug levels (last 3 results):  No results for input(s): VANCOMYCINRA, VANCORANDOM, VANCOMYCINPE, VANCOPEAK, VANCOMYCINTR, VANCOTROUGH in the last 72 hours.    Microbiologic Results:  Microbiology Results (last 7 days)       Procedure Component Value Units Date/Time    Blood culture [[id number]]     Order Status: Sent Specimen: Blood

## 2023-03-18 NOTE — PLAN OF CARE
Problem: Aspiration (Enteral Nutrition)  Goal: Absence of Aspiration Signs and Symptoms  Outcome: Ongoing, Progressing  Intervention: Minimize Aspiration Risk  Flowsheets (Taken 3/18/2023 0749)  Head of Bed (HOB) Positioning: HOB at 30-45 degrees  Oral Care: other (see comments)  Enteral Feeding Safety: drug-nutrient compatibility checked     Problem: Device-Related Complication Risk (Enteral Nutrition)  Goal: Safe, Effective Therapy Delivery  Outcome: Ongoing, Progressing  Intervention: Prevent Feeding-Related Adverse Events  Flowsheets (Taken 3/18/2023 0749)  Enteral Feeding Safety: drug-nutrient compatibility checked     Problem: Feeding Intolerance (Enteral Nutrition)  Goal: Feeding Tolerance  Outcome: Ongoing, Progressing  Intervention: Prevent and Manage Feeding Intolerance  Flowsheets (Taken 3/18/2023 0749)  Nutrition Support Management:   weight trending reviewed   other (see comments)   tube feeding held     Recommendations  1.) When medically appropriate, begin enteral nutrition within 48hr: Vital HP @ 20mL/hr advancing q4h to goal rate 40mL/hr to provide 960 kcals, 84gm protein and 803mL of free water.   If GRV >500mL, hold TF and consult RD.   TF + current propofol rate to meet 100% EEN and EPN.   2.) If ileus present and unable to begin enteral nutrition. suggest Clinimix @ 75mL/hr to provide 612 kcals, 77gm protein; GIR 1.0.   3.) Suggest addition of MVI.     Goals:   1.) Pt to receive nutrition within 48hr.  Nutrition Goal Status: new

## 2023-03-18 NOTE — CONSULTS
Pulmonary/Critical Care Consult      Patient name: Mishel Ruelas  MRN: [id number]  Date: 03/18/2023    Admit Date: 3/17/2023  Consult Requested By: Campbell Interiano MD    Reason for Consult: s/p arrest, respiratory failure    HPI:    3/18/2023 - 26 yo h/o opioid abuse found in bathroom slumped over the sink with a needle in her leg, CPR was started and EMS called, initial rhythm was asystole.  She got narcan and 1 mg epi and reportedly had ROSC soon after.  By reported she vomited and may have aspirated while being bagged.  Brought to ER and intubated.  Had hyperkalemia (which was addressed), acidosis (being treated).  Overnight she had some mottling of her left hand and that is being evaluated (has occluded left peroneal artery)  This AM she is being cooled (just reached goal), she is sedated, not paralyzed.  No pupil response, no corneals, no response to pain, no shivering, has taken occasional spontaneous respiratory effort.  WBC has increased.  K low, + acidosis (AG+), + LA, PHO4 low, ECHO shows EF 25%    Review of Systems    Review of Systems   Unable to perform ROS: Intubated     Past Medical History    Past Medical History:   Diagnosis Date    Seizures        Past Surgical History    Past Surgical History:   Procedure Laterality Date    APPENDECTOMY         Medications (scheduled):      acetylcysteine 200 mg/ml (20%)  600 mg Oral BID    ceFEPime (MAXIPIME) IVPB  2 g Intravenous Q8H    enoxparin  40 mg Subcutaneous Daily    pantoprazole  40 mg Intravenous Daily    potassium chloride  60 mEq Oral Once    thiamine (VITAMIN B1) IVPB  100 mg Intravenous Daily    vancomycin (VANCOCIN) IVPB  25 mg/kg Intravenous Once    [START ON 3/19/2023] vancomycin (VANCOCIN) IVPB  1,250 mg Intravenous Q24H       Medications (infusions):      sodium chloride 0.9% 150 mL/hr at 03/18/23 0718    fentanyl 50 mcg/hr (03/18/23 0046)    NORepinephrine bitartrate-D5W Stopped (03/17/23 2200)    propofoL 40 mcg/kg/min  "(03/18/23 0815)       Medications (prn):     dextrose 10%, dextrose 10%, glucagon (human recombinant), hydrALAZINE, insulin aspart U-100, sodium chloride 0.9%, Pharmacy to dose Vancomycin consult **AND** vancomycin - pharmacy to dose    Family History:   Family History   Problem Relation Age of Onset    Thyroid disease Mother     Hypertension Father        Social History: Tobacco:   Social History     Tobacco Use   Smoking Status Some Days    Packs/day: 0.00    Types: Cigarettes   Smokeless Tobacco Never   Tobacco Comments    patient reports she only smokes 2-3 cigarettes a week                                 EtOH:   Social History     Substance and Sexual Activity   Alcohol Use Yes    Alcohol/week: 1.0 standard drink    Types: 1 Glasses of wine per week                                Drugs:   Social History     Substance and Sexual Activity   Drug Use Not on file    Comment: SEE PREVIOUS ER VISITS       Physical Exam    Vital signs:  Temp:  [91.4 °F (33 °C)-98.4 °F (36.9 °C)]   Pulse:  []   Resp:  [11-35]   BP: ()/()   SpO2:  [93 %-100 %]   Arterial Line BP: ()/(63-82)     Intake/Output:   Intake/Output Summary (Last 24 hours) at 3/18/2023 0957  Last data filed at 3/17/2023 2343  Gross per 24 hour   Intake 1053.13 ml   Output --   Net 1053.13 ml        BMI: Estimated body mass index is 23.85 kg/m² as calculated from the following:    Height as of this encounter: 5' 5" (1.651 m).    Weight as of this encounter: 65 kg (143 lb 4.8 oz).    Physical Exam  Vitals and nursing note reviewed.   Constitutional:       General: She is not in acute distress.     Appearance: She is ill-appearing. She is not toxic-appearing or diaphoretic.      Comments: Young  Unresponsive, sedated  Ventilated   HENT:      Head: Normocephalic and atraumatic.      Right Ear: External ear normal.      Left Ear: External ear normal.      Nose: Nose normal. No congestion or rhinorrhea.      Mouth/Throat:      Mouth: Mucous " membranes are moist.      Pharynx: Oropharynx is clear. No oropharyngeal exudate or posterior oropharyngeal erythema.      Comments: ETT  Eyes:      General: No scleral icterus.        Right eye: No discharge.         Left eye: No discharge.      Conjunctiva/sclera: Conjunctivae normal.      Comments: No pupils, no corneals, no Doll's eyes   Neck:      Vascular: No carotid bruit.   Cardiovascular:      Rate and Rhythm: Normal rate and regular rhythm.      Pulses: Normal pulses.      Heart sounds: No murmur heard.    No friction rub. No gallop.   Pulmonary:      Effort: Pulmonary effort is normal. No respiratory distress.      Breath sounds: No stridor. No wheezing, rhonchi or rales.      Comments: Ventilated   Chest:      Chest wall: No tenderness.   Abdominal:      General: There is no distension.      Tenderness: There is no abdominal tenderness. There is no guarding.      Comments: No BS heard   Genitourinary:     Comments: Acosta   Musculoskeletal:         General: No swelling. Normal range of motion.      Cervical back: Normal range of motion and neck supple. No rigidity or tenderness.      Right lower leg: No edema.      Left lower leg: No edema.      Comments: Left hand dusky   Lymphadenopathy:      Cervical: No cervical adenopathy.   Skin:     General: Skin is warm and dry.      Coloration: Skin is not jaundiced.      Findings: No bruising.      Comments: Multiple tattoos   Neurological:      Comments: No response   Psychiatric:      Comments: Not able to assess       Laboratory    Recent Labs   Lab 03/18/23  0616   WBC 23.38*   RBC 5.13   HGB 16.9*   HCT 48.4      MCV 94   MCH 32.9*   MCHC 34.9       Recent Labs   Lab 03/18/23  0616   CALCIUM 8.9   PROT 6.9      K 2.3*   CO2 11*   *   BUN 31*   CREATININE 1.2   ALKPHOS 79   *   *   BILITOT 1.2*       No results for input(s): PT, INR, APTT in the last 24 hours.    Recent Labs   Lab 03/17/23  2137   CPK 66       Additional  labs: reviewed    Microbiology:       Microbiology Results (last 7 days)       Procedure Component Value Units Date/Time    Blood culture [[id number]]     Order Status: Sent Specimen: Blood             Radiology    X-Ray Chest 1 View  Reason: ETT placement    FINDINGS:    Portable chest with comparison chest x-ray March 17, 2023 show normal cardiomediastinal silhouette. Endotracheal tube and nasogastric tube are unchanged. Right internal jugular central venous catheter tip is in the anatomic region of the SVC. No pneumothorax.  Patchy hazy lung opacities are noted in the right perihilar distribution. Pulmonary vasculature is normal. No acute osseous abnormality.    IMPRESSION:    1.  Lines and tubes as described.  2.  No pneumothorax.  3.  Right perihilar patchy hazy lung opacities could reflect asymmetric pulmonary edema versus infiltrate.    Electronically signed by:  Jamarcus Coleman DO  3/18/2023 9:45 AM CDT Workstation: [id number]  Echo  Addendum: · The left ventricle is normal in size with severely decreased systolic  function.   · The estimated ejection fraction is 25%.   · Grade I left ventricular diastolic dysfunction.   · Mild right ventricular enlargement with mildly reduced right ventricular  systolic function.   · Mild mitral regurgitation.  Narrative: · The left ventricle is normal in size with severely decreased systolic   function.  · The estimated ejection fraction is 25%.  · Grade I left ventricular diastolic dysfunction.  · Mild right ventricular enlargement with mildly reduced right ventricular   systolic function.  · Mild mitral regurgitation.     US Upper Extremity Arteries Left  REASON: pain    TECHNIQUE: Grayscale and color Doppler ultrasound of the left upper extremity arteries.    COMPARISON: None.    FINDINGS:    No color Doppler flow demonstrated in the left radial artery consistent with occlusion. There is monophasic blood flow in the left ulnar artery which is patent. The left subclavian,  axillary, and brachial arteries are patent with normal peak systolic velocities.    IMPRESSION:    Occluded left peroneal artery.    Electronically signed by:  Jamarcus Coleman DO  3/18/2023 8:24 AM CDT Workstation: [id number]  X-Ray Chest AP Portable  EXAM DESCRIPTION: XR CHEST AP PORTABLE    CLINICAL HISTORY:27 years Female, Chest Pain    Comparison: None    IMPRESSION:    Endotracheal tube terminating 3.7 cm above the evangelist.  Appropriate placement of nasogastric tube.    Right lung base opacity with small pleural effusion.  No pleural effusion. No pneumothorax.  Cardiomediastinal silhouette is within normal limits.  No acute osseous abnormality.    Electronically signed by:  Olu Robert DO  3/18/2023 1:19 AM CDT Workstation: [id number]  US Upper Extremity Veins Left  PROCEDURE:  US Duplex Left Upper Extremity Veins    CLINICAL INDICATION:  The patient is 27 years old and is Female; swelling    TECHNIQUE:  Real-time duplex ultrasound scan of the left upper extremity veins integrating B-mode two-dimensional vascular structure, Doppler spectral analysis, color flow Doppler imaging and compression.    COMPARISON:  None.    FINDINGS:  DEEP VEINS:  Unremarkable.  No DVT in the internal jugular, subclavian, axillary, or brachial veins.  The veins demonstrate normal color flow, are normally compressible, with normal phasic flow and/or augmentation response.  SUPERFICIAL VEINS:  Unremarkable.  No thrombus in the visualized basilic and cephalic veins.  SOFT TISSUES:  No acute findings.    IMPRESSION:    No left upper extremity DVT.    Electronically signed by:  Olu Robert DO  3/18/2023 1:18 AM CDT Workstation: [id number]  CT Abdomen Pelvis With Contrast  EXAM DESCRIPTION: CT ABDOMEN PELVIS WITH CONTRAST 3/18/2023 12:48 AM CDT    CLINICAL HISTORY: 27 years, Female, Sepsis    COMPARISON: None.    PROCEDURE:    Contrast-enhanced images of the abdomen and pelvis were performed utilizing 2 mm slice thickness at 2 mm  interval reconstruction from the lung bases to the ischial tuberosities after the administration of IV contrast.  In addition multiplanar reformats in the coronal and sagittal plane were obtained and reviewed.  This exam was performed according to our departmental dose-optimization protocol, which includes automated exposure control, adjustment of the mA and/or kV according to patient size and/or use of iterative reconstruction technique.    FINDINGS:    Evaluation of the chest in separate report.    There is a nasal gastric tube within the antrum of the stomach in good position.  There are dilated proximal small bowel loops extending down to the ileocecal valve and fecal residue and air involving the large bowel. No evidence for transition point could be identified and findings could correspond to perhaps ileus.    The liver, gallbladder, pancreas, spleen and adrenal glands demonstrate to be unremarkable, no focal lesions are noted.  The kidneys demonstrate normal uptake of contrast media. No evidence for nephrolithiasis and/or hydronephrosis.  The urinary bladder demonstrate the presence of a Acosta catheter.  The uterus is unremarkable. There are no adnexal masses.  The aorta demonstrate to be normal.  There is no retroperitoneal lymphadenopathy. There is no evidence for ascites/or significant abnormal fluid collection. The rest of the soft tissue and bony structures are within normal limits.    IMPRESSION:  Dilated proximal mid and distal small bowel loops extending down to the ileocecal valve with fluid and fecal residue as well as air involving the large bowel. No evidence for transition point could be identified and findings could correspond to the lytic the ileus.    NG tube and Acosta catheter in good position.    Electronically signed by:  Randall Diaz MD  3/18/2023 12:50 AM CDT Workstation: [id number]  CTA Chest Non-Coronary (PE Studies)  EXAM DESCRIPTION: CTA CHEST NON CORONARY (PE STUDIES) 3/18/2023  12:41 AM CDT    CLINICAL HISTORY: 27 years, Female, Pulmonary embolism (PE) suspected, high prob    COMPARISON: None    PROCEDURE:  Multiple transaxial tomograms of the chest were obtained from the lung apices through the lung bases utilizing 1 mm slice thickness at 1 mm interval reconstruction after the administration of 100 cc of Omnipaque 350 for complete opacification of the pulmonary arteries.  Subsequent 3-D maximum intensity projection images were generated in the coronal and sagittal plane for review.  This exam was performed according to our departmental dose-optimization protocol, which includes automated exposure control, adjustment of the mA and/or kV according to patient size and/or use of iterative reconstruction technique.    FINDINGS:  There is a endotracheal tube and nasogastric tube in good position  The lungs parenchyma demonstrate small focal area of tree-in-bud nodularity involving the apical segment and anterior segment right upper lobe, minimally along the perihilar posterior segment right lower lobe and minimally posterior segment left lower lobe suspicious for focal areas of infection/inflammation, aspiration could be of consideration. No significant masses and/or nodules are identified.  The trachea mainstem bronchus demonstrate to be normal. There is no significant pericardial or pleural effusions.  The thoracic aorta demonstrate to be unremarkable. The heart is normal in size. There are no significant coronary artery calcifications.  There is no significant mediastinal and/or hilar lymphadenopathy. The axillary regions demonstrate to be clear.  Pulmonary arteries demonstrate to be normal, no intraluminal defect are seen that would suggest pulmonary embolus.  The bone windows demonstrate to be unremarkable.  Evaluation of the abdomen will be given in separate report.    IMPRESSION:  No CT evidence of pulmonary embolus.    Endotracheal tube and nasogastric tube in good position.    There is  a endotracheal tube and nasogastric tube in good position    The lungs parenchyma demonstrate small focal area of tree-in-bud nodularity involving the apical segment and anterior segment right upper lobe, minimally along the perihilar posterior segment right lower lobe and minimally posterior segment left lower lobe suspicious for focal areas of infection/inflammation, aspiration could be of consideration.    Electronically signed by:  Randall Diaz MD  3/18/2023 12:48 AM CDT Workstation: [id number]        Additional Studies    reviewed    Ventilator Information    Vent Mode: A/C  Oxygen Concentration (%):  [35-80] 35  Resp Rate Total:  [30 br/min-35 br/min] 30 br/min  Vt Set:  [500 mL] 500 mL  PEEP/CPAP:  [5 cmH20] 5 cmH20  Pressure Support:  [0 cmH20] 0 cmH20  Mean Airway Pressure:  [7.7 okN28-21 cmH20] 12 cmH20         Recent Labs     03/18/23  0354   PH 7.321*   PCO2 24.1*   PO2 191*   HCO3 12.5*   POCSATURATED 100   BE -14         Impression    Active Hospital Problems    Diagnosis  POA    *Cardiac arrest [I46.9]  Unknown    Acute hypoxemic respiratory failure [J96.01]  Unknown    Lactic acidosis [E87.20]  Unknown    Hypophosphatemia [E83.39]  Unknown    Hypokalemia [E87.6]  Unknown    Leukocytosis [D72.829]  Unknown    Transaminitis [R74.01]  Unknown    Acute systolic congestive heart failure [I50.21]  Unknown      Resolved Hospital Problems   No resolved problems to display.       Plan    Ventilator and adjust as needed  Recheck ABG and adjust bicarb drip as needed  Continue antibiotics, check MRSA screen and if negative stop vancomycin  WBC may be elevated due to stress  Trend lactic acid  CHF noted  Trend LFT  Continue with cooling protocol  Neuro aware and will follow  Aware of drug abuse  Follow electrolytes and replace via SS  Try to minimize sedation so we can assess neuro status  Decreased EF noted  Overall prognosis is poor    Thank you for this consult.  I will follow with you while the patient is  hospitalized.      Critical Care Time    I have spent > 35 minutes providing critical care services for this pt for the above diagnoses.  These services have included pt evaluation, pt exam, ventilator assessment, discussions with staff, chart review, data review, note preparation and .  Medications have been reviewed and adjusted as needed.  The patient has life threatening illness with a high risk of decompensation and/or death.      Dominic Patrick MD  [facility] Pulmonary/Critical Care

## 2023-03-18 NOTE — PROGRESS NOTES
Reviewed the H&P by Dr. Mckenna  Examined her myself  Absent brain stem reflexes  EEG showed diffuse slowing  D/w with neurology  D/w with family about poor neurological outcomes  Mother understands her clinical condition  Mother would like to proceed with organ donation when father has arrived from [place]  I would withdraw the care after once family has consensus on withdrawal of care  For now continue current level of care.

## 2023-03-18 NOTE — CARE UPDATE
03/18/23 0721   Patient Assessment/Suction   Level of Consciousness (AVPU) unresponsive   Respiratory Effort Gasping   Expansion/Accessory Muscles/Retractions expansion symmetric   All Lung Fields Breath Sounds clear   Rhythm/Pattern, Respiratory assisted mechanically   Cough Frequency no cough   Suction Method oral;tracheal   $ Suction Charges Inline Suction Procedure Stat Charge;Close Suction System Equipment   Secretions Amount small   Secretions Color tan   Skin Integrity   $ Wound Care Tech Time 15 min   Area Observed Left;Right;Cheek;Upper lip;Lower lip;Corner lip   Skin Appearance without discoloration   PRE-TX-O2   Device (Oxygen Therapy) ventilator   $ Is the patient on Low Flow Oxygen? Yes   Oxygen Concentration (%) 35   SpO2 100 %   Pulse Oximetry Type Continuous   $ Pulse Oximetry - Multiple Charge Pulse Oximetry - Multiple   Pulse 72        Airway - Non-Surgical 03/17/23 2132   Placement Date/Time: 03/17/23 2132   Method of Intubation: Glidescope  Airway Device Size: 7.5  Style: Cuffed  Cuff Inflated With: Air  Secured at: Lips  Insertion attempts (enter comment if more than 2 attempts): 1   Secured at 24 cm   Measured At Lips   Secured Location Right   Secured by Commercial tube mccain   Bite Block none   Site Condition Cool;Dry   Status Intact;Secured;Patent   Site Assessment Clean;Dry   Vent Select   Conventional Vent Y   $ Ventilator Subsequent 1   Charged w/in last 24h YES   Preset Conventional Ventilator Settings   Vent ID [id number]   Vent Type    Ventilation Type VC   Vent Mode A/C   Humidity HME   Set Rate 30 BPM   Vt Set 500 mL   PEEP/CPAP 5 cmH20   Peak Flow 80 L/min   Peak End Inspiratory Pressure 17 cmH20   I-Trigger Type  V-TRIG   Trigger Sensitivity Flow/I-Trigger 3 L/min   Patient Ventilator Parameters   Resp Rate Total 30 br/min   Peak Airway Pressure 26 cmH20   Mean Airway Pressure 11 cmH20   Plateau Pressure 0 cmH20   Exhaled Vt 516 mL   Total Ve 15.5 L/m   I:E Ratio Measured 1:1.90    Auto PEEP 0 cmH20   Conventional Ventilator Alarms   Alarms On Y   Ve High Alarm 25 L/min   Ve Low Alarm 5 L/min   Vt High Alarm 1200 mL   Vt Low Alarm 200 mL   Resp Rate High Alarm 45 br/min   Press High Alarm 50 cmH2O   Apnea Rate 30   Apnea Volume (mL) 1 mL   Apnea Oxygen Concentration  100   Apnea Flow Rate (L/min) 80   T Apnea 20 sec(s)   Ready to Wean/Extubation Screen   FIO2<=50 (chart decimal) 0.35   MV<16L (chart vol.) 15.5   PEEP <=8 (chart #) 5   Vital Capacity   Vital Capacity (mL) 0   Education   $ Education Bronchodilator;Ventilator Oxygen;15 min   Respiratory Evaluation   $ Care Plan Tech Time 15 min   $ Eval/Re-eval Charges Re-evaluation

## 2023-03-18 NOTE — ED PROVIDER NOTES
Encounter Date: 3/17/2023       History     Chief Complaint   Patient presents with    Drug Overdose     CARDIAC ARREST. LAST KNOWN ALIVE 2015.      Chief complaint is cardiopulmonary arrest.  This young female who has a history of IV drug use was found on the floor.  There were needles in the bathroom.  Family found her in duress and started CPR.  Fire department arrived and continued CPR.  Initial rhythm was asystole.  Fire Department gave her 2 mg of Narcan.  Upon bagging the patient by the fire department she did vomit.  EMS arrived and established a left arm IO.  She was given 1 mg of Narcan via the IO.  She was also given 1 mg of epinephrine through the IO and then developed a pulse at the next pulse check.  She was apneic but had a pulse and was assisted with respirations by placing an LMA.  Upon arrival by EMS she was purple and cyanotic.      Review of patient's allergies indicates:  No Known Allergies  Past Medical History:   Diagnosis Date    Seizures      Past Surgical History:   Procedure Laterality Date    APPENDECTOMY       Family History   Problem Relation Age of Onset    Thyroid disease Mother     Hypertension Father      Social History     Tobacco Use    Smoking status: Some Days     Packs/day: 0.00     Types: Cigarettes    Smokeless tobacco: Never    Tobacco comments:     patient reports she only smokes 2-3 cigarettes a week    Substance Use Topics    Alcohol use: Yes     Alcohol/week: 1.0 standard drink     Types: 1 Glasses of wine per week     Review of Systems   Unable to perform ROS: Acuity of condition     Physical Exam     Initial Vitals   BP Pulse Resp Temp SpO2   03/17/23 2136 03/17/23 2131 03/17/23 2137 -- 03/17/23 2134   (!) 84/55 (!) 140 (!) 32  96 %      MAP       --                Physical Exam    Nursing note and vitals reviewed.  Constitutional: She appears well-developed and well-nourished.   HENT:   Head: Normocephalic and atraumatic.   Eyes: Conjunctivae, EOM and lids are normal.    The fundi reveal dilated pupils 10 mm.   Neck: Trachea normal. Neck supple. No thyroid mass and no thyromegaly present.   Normal range of motion.  Cardiovascular:  Normal rate and normal heart sounds.           Tachycardic rhythm   Pulmonary/Chest: Effort normal and breath sounds normal.   After intubation patient had good breath sounds bilaterally.  She did aspirate clinically by vomiting at the scene.  Respiratory suctioned material from the lungs.   Abdominal: Abdomen is soft. There is no abdominal tenderness.   Musculoskeletal:      Cervical back: Normal range of motion and neck supple.     Neurological: No cranial nerve deficit or sensory deficit.   Skin: Skin is warm.   Psychiatric: Her speech is normal. Judgment normal.       ED Course   Procedures  Labs Reviewed   CBC W/ AUTO DIFFERENTIAL - Abnormal; Notable for the following components:       Result Value     (*)     MCH 33.3 (*)     Immature Granulocytes 2.6 (*)     Immature Grans (Abs) 0.28 (*)     Mono % 3.0 (*)     All other components within normal limits   COMPREHENSIVE METABOLIC PANEL - Abnormal; Notable for the following components:    Potassium 7.3 (*)     CO2 19 (*)     Glucose 216 (*)      (*)     ALT 75 (*)     All other components within normal limits    Narrative:      k critical result(s) repeated. Called and verbal readback obtained   from wild [facility]son rn er  by ED 03/17/2023 22:13   TROPONIN I HIGH SENSITIVITY - Abnormal; Notable for the following components:    Troponin I High Sensitivity 22.8 (*)     All other components within normal limits   URINALYSIS, REFLEX TO URINE CULTURE - Abnormal; Notable for the following components:    Protein, UA 2+ (*)     Occult Blood UA Trace (*)     All other components within normal limits    Narrative:     Specimen Source->Urine   URINALYSIS MICROSCOPIC - Abnormal; Notable for the following components:    Hyaline Casts, UA 5 (*)     All other components within normal limits    Narrative:      Specimen Source->Urine   POCT GLUCOSE - Abnormal; Notable for the following components:    POC Glucose 206 (*)     All other components within normal limits   ISTAT PROCEDURE - Abnormal; Notable for the following components:    POC PH 7.096 (*)     POC PCO2 66.2 (*)     POC PO2 195 (*)     POC HCO3 20.4 (*)     POC Glucose 178 (*)     POC Potassium 6.4 (*)     POC TCO2 22 (*)     All other components within normal limits   MAGNESIUM   B-TYPE NATRIURETIC PEPTIDE   DRUG SCREEN PANEL, URINE EMERGENCY    Narrative:     Specimen Source->Urine   CK   TROPONIN I HIGH SENSITIVITY   LACTIC ACID, PLASMA   LIPASE   URINALYSIS, REFLEX TO URINE CULTURE   TYPE & SCREEN          Imaging Results              CT Cervical Spine Without Contrast (Final result)  Result time 03/17/23 23:06:23      Final result by Olu Robert DO (03/17/23 23:06:23)                   Narrative:    PROCEDURE:  CT Head and Cervical Spine Without Intravenous Contrast    CLINICAL INDICATION:  The patient is 27 years old and is Female; fall    TECHNIQUE:  Axial computed tomography images of the head/brain and cervical spine without intravenous contrast.  Sagittal and coronal reformatted images were created and reviewed.  This CT exam was performed using one or more of the following dose reduction techniques:  automated exposure control, adjustment of the mA and/or kV according to patient size, and/or use of iterative reconstruction technique.    DLP: 7:15 mGycm    COMPARISON:  CT head without contrast dated 5/22/2021.    FINDINGS:  BRAIN:  Unremarkable.  No hemorrhage.  No significant white matter disease.  No edema.  VENTRICLES:  Unremarkable.  No ventriculomegaly.  SKULL:  No acute fracture.  SINUSES:  Unremarkable as visualized.  No acute sinusitis.  MASTOID AIR CELLS:  Unremarkable as visualized.  No mastoid effusion.  VERTEBRAE:  Unremarkable.  No acute fracture.  Normal alignment.  DISCS/SPINAL CANAL/NEURAL FORAMINA:  No acute findings.  No  spinal canal stenosis.  SOFT TISSUES:  Unremarkable.  LUNG APICES:  Visualized lungs are clear.  TUBES, LINES AND DEVICES:  Partially seen endotracheal and orogastric tubes.    IMPRESSION:    1.  No acute intracranial hemorrhage, hydrocephalus or herniation .    2.  No acute cervical spine fracture or subluxation.    Electronically signed by:  Olu Robert DO  3/17/2023 11:06 PM CDT Workstation: [id number]                                     CT Head Without Contrast (Final result)  Result time 03/17/23 23:06:23      Final result by Olu Robert DO (03/17/23 23:06:23)                   Narrative:    PROCEDURE:  CT Head and Cervical Spine Without Intravenous Contrast    CLINICAL INDICATION:  The patient is 27 years old and is Female; fall    TECHNIQUE:  Axial computed tomography images of the head/brain and cervical spine without intravenous contrast.  Sagittal and coronal reformatted images were created and reviewed.  This CT exam was performed using one or more of the following dose reduction techniques:  automated exposure control, adjustment of the mA and/or kV according to patient size, and/or use of iterative reconstruction technique.    DLP: 7:15 mGycm    COMPARISON:  CT head without contrast dated 5/22/2021.    FINDINGS:  BRAIN:  Unremarkable.  No hemorrhage.  No significant white matter disease.  No edema.  VENTRICLES:  Unremarkable.  No ventriculomegaly.  SKULL:  No acute fracture.  SINUSES:  Unremarkable as visualized.  No acute sinusitis.  MASTOID AIR CELLS:  Unremarkable as visualized.  No mastoid effusion.  VERTEBRAE:  Unremarkable.  No acute fracture.  Normal alignment.  DISCS/SPINAL CANAL/NEURAL FORAMINA:  No acute findings.  No spinal canal stenosis.  SOFT TISSUES:  Unremarkable.  LUNG APICES:  Visualized lungs are clear.  TUBES, LINES AND DEVICES:  Partially seen endotracheal and orogastric tubes.    IMPRESSION:    1.  No acute intracranial hemorrhage, hydrocephalus or herniation .    2.   No acute cervical spine fracture or subluxation.    Electronically signed by:  Olu Lauraalin TOBAR  3/17/2023 11:06 PM CDT Workstation: [id number]                                     X-Ray Chest AP Portable (In process)                      Medications   NORepinephrine 4 mg in dextrose 5% 250 mL infusion (premix) (titrating) (0 mcg/kg/min × 65 kg Intravenous Stopped 3/17/23 2200)   propofol (DIPRIVAN) 10 mg/mL infusion (15 mcg/kg/min × 65 kg Intravenous New Bag 3/17/23 2200)   calcium gluconate 1 g in NS IVPB (premixed) (1 g Intravenous New Bag 3/17/23 2238)   sodium chloride 0.9% bolus 1,950 mL 1,950 mL (has no administration in time range)   sodium bicarbonate solution 50 mEq (has no administration in time range)   sodium chloride 0.9% flush 10 mL (has no administration in time range)   sodium chloride 0.9% bolus (0 mLs Intravenous Stopped 3/17/23 2200)   NORepinephrine 4 mg in dextrose 5 % (D5W) 250 mL infusion (0 mcg/kg/min × 65 kg Intravenous Stopped 3/17/23 2153)   sodium bicarbonate 8.4 % (1 mEq/mL) injection 50 mEq (50 mEq Intravenous Given 3/17/23 2158)   albuterol nebulizer solution 10 mg (10 mg Nebulization Given 3/17/23 2239)   dextrose 50% injection 25 g (25 g Intravenous Given 3/17/23 2241)   insulin regular injection 10 Units 0.1 mL (10 Units Intravenous Given 3/17/23 2230)   fentaNYL 50 mcg/mL injection 100 mcg (100 mcg Intravenous Given 3/17/23 2315)     Medical Decision Making:   ED Management:  The patient in the ER was evaluated upon arrival.  The LMA was removed and the patient was intubated with video scope without difficulty 1st past.  Equal breath sounds noted thereafter after 7.5 ET tube placed.  Respiratory then suctioned the ET tube and later we placed an orogastric tube as well.  IVs were established lab work was obtained.  Initially the patient had a good CBG by EMS.    The patient was intubated.  She had a pulse or blood pressure.  Initial blood pressure was low.  Levophed was initiated  "but that was terminated because of her having an increased blood pressure with tachycardia.  ABG was reviewed adjustments made with respiratory.  Propofol increased.  One point she started to breathe slightly.  Ago now is to give her propofol to give her rest watch her blood pressure.  We will also give her fentanyl for pain and blood pressure control.  The case was discussed in detail with the hospitalist and the patient is to be admitted to ICU.    Per the boyfriend he found her sitting on a sink with her drug "rig".  She had appeared to lock herself in the bathroom and took him time to get to her.    She was last seen alive at 8:15 p.m..                        Clinical Impression:   Final diagnoses:  [R07.9] Chest pain        ED Disposition Condition    Admit                 Krystle Ontiveros MD  03/17/23 9967    "

## 2023-03-18 NOTE — CONSULTS
"[facility]  Adult Nutrition   Consult Note (Nutrition Support Management)    SUMMARY     Recommendations  1.) When medically appropriate, begin enteral nutrition within 48hr: Vital HP @ 20mL/hr advancing q4h to goal rate 40mL/hr to provide 960 kcals, 84gm protein and 803mL of free water.   If GRV >500mL, hold TF and consult RD.   TF + current propofol rate to meet 100% EEN and EPN.   2.) If ileus present and unable to begin enteral nutrition. suggest Clinimix @ 75mL/hr to provide 612 kcals, 77gm protein; GIR 1.0.   3.) Suggest addition of MVI.    Goals:   1.) Pt to receive nutrition within 48hr.  Nutrition Goal Status: new    Dietitian Rounds Brief  Pt admitted s/p cardiac arrest with possible drug overdose. s/p CT: CAP w/o PE with possible ileus and aspiration from vomiting after Narcan administration. At this time, pt intubated, sedated with propofol (@ 19.5mL/ng=[id number]), OGT in place. Cooling protocol in place at this time. Per H&P, pt was in usual state of health. No GI distress. LBM PTA. Skin: intact. Wt reviewed. NFPE not completed at this time d/t cooling protocol.    Diet order:   Current Diet Order: NPO       Evaluation of Received Nutrient/Fluid Intake  Other Calories (kcal): 515 (propofol @ 19.5mL/hr)  Energy Calories Required: not meeting needs  Protein Required: not meeting needs  Fluid Required: meeting needs  Tolerance: other (see comments) (NPO)     % Intake of Estimated Energy Needs: 0 - 25 %  % Meal Intake: NPO      Intake/Output Summary (Last 24 hours) at 3/18/2023 0748  Last data filed at 3/17/2023 2343  Gross per 24 hour   Intake 1053.13 ml   Output --   Net 1053.13 ml        Anthropometrics  Temp: (!) 94.9 °F (34.9 °C)  Height: 5' 5" (165.1 cm)  Height (inches): 65 in  Weight: 65 kg (143 lb 4.8 oz)  Weight (lb): 143.3 lb  Ideal Body Weight (IBW), Female: 125 lb  % Ideal Body Weight, Female (lb): 114.64 %  BMI (Calculated): 23.8  BMI Grade: 18.5-24.9 - normal   "     Estimated/Assessed Needs  Weight Used For Calorie Calculations: 65 kg (143 lb 4.8 oz)  Energy Calorie Requirements (kcal): 1396  Energy Need Method: [facility]  Protein Requirements: 78-98 (1.2-1.5)  Weight Used For Protein Calculations: 65 kg (143 lb 4.8 oz)     Estimated Fluid Requirement Method: RDA Method  RDA Method (mL): 1396       Reason for Assessment  Reason For Assessment: consult  Diagnosis: cardiac disease  Relevant Medical History: IV opioid abuse, seizures     Nutrition/Diet History  Food Allergies: NKFA  Factors Affecting Nutritional Intake: NPO, on mechanical ventilation    Weight History:  Wt Readings from Last 5 Encounters:   03/18/23 65 kg (143 lb 4.8 oz)   05/22/21 62.2 kg (137 lb 2 oz)   12/19/20 54.4 kg (120 lb)   12/25/19 54.4 kg (120 lb)   01/02/19 68.8 kg (151 lb 9.6 oz)        Lab/Procedures/Meds: Pertinent Labs/Meds Reviewed    Medications:Pertinent Medications Reviewed  Scheduled Meds:   acetylcysteine 200 mg/ml (20%)  600 mg Oral BID    ceFEPime (MAXIPIME) IVPB  2 g Intravenous Q8H    enoxparin  40 mg Subcutaneous Daily    pantoprazole  40 mg Intravenous Daily    potassium chloride  60 mEq Oral Once    sodium chloride 0.9%  1,000 mL Intravenous Once    thiamine (VITAMIN B1) IVPB  100 mg Intravenous Daily    vancomycin (VANCOCIN) IVPB  25 mg/kg Intravenous Once     Continuous Infusions:   sodium chloride 0.9% 125 mL/hr at 03/18/23 0650    fentanyl 50 mcg/hr (03/18/23 0046)    NORepinephrine bitartrate-D5W Stopped (03/17/23 2200)    propofoL 50 mcg/kg/min (03/18/23 0146)     PRN Meds:.dextrose 10%, dextrose 10%, glucagon (human recombinant), hydrALAZINE, insulin aspart U-100, sodium chloride 0.9%, Pharmacy to dose Vancomycin consult **AND** vancomycin - pharmacy to dose    Labs: Pertinent Labs Reviewed  Clinical Chemistry:  Recent Labs   Lab 03/17/23  5297      K 7.3*      CO2 19*   *   BUN 19   CREATININE 1.2   CALCIUM 8.8   PROT 6.9   ALBUMIN 3.8   BILITOT 0.6    ALKPHOS 91   *   ALT 75*   ANIONGAP 14   MG 2.5     CBC:   Recent Labs   Lab 03/18/23  0616   WBC 23.38*   RBC 5.13   HGB 16.9*   HCT 48.4      MCV 94   MCH 32.9*   MCHC 34.9     Cardiac Profile:  Recent Labs   Lab 03/17/23  2137   BNP 24   CPK 66       Monitor and Evaluation  Food and Nutrient Intake: enteral nutrition intake  Food and Nutrient Adminstration: enteral and parenteral nutrition administration  Physical Activity and Function: nutrition-related ADLs and IADLs  Anthropometric Measurements: weight, weight change  Biochemical Data, Medical Tests and Procedures: electrolyte and renal panel, gastrointestinal profile, glucose/endocrine profile  Nutrition-Focused Physical Findings: overall appearance     Nutrition Risk  Level of Risk/Frequency of Follow-up: high     Nutrition Follow-Up  RD Follow-up?: Yes      Micheline Chauhan RD 03/18/2023 7:48 AM

## 2023-03-18 NOTE — NURSING
Pt remains on vent with 50mcg fentanyl for sedation. Pt unresponsive, pupils 7mm fixed bilat, no response to pain. No cough or gag. Pt will occasionally breath over vent rate. Remains on hypothermia protocol; target temp of 33 celcius achieved at 0700. [facility] has talked to the mom and the plan is to withdraw/turn over care to [facility] once the patients brother gets in town sometime tomorrow morning.

## 2023-03-18 NOTE — ANESTHESIA PROCEDURE NOTES
Arterial    Diagnosis: s/p cardiac arrest    Patient location during procedure: ICU  Procedure start time: 3/18/2023 5:14 AM  Timeout: 3/18/2023 5:13 AM  Procedure end time: 3/18/2023 5:21 AM    Staffing  Authorizing Provider: Ayo Duque Jr., MD  Performing Provider: Ayo Duque Jr., MD    Anesthesiologist was present at the time of the procedure.    Preanesthetic Checklist  Completed: patient identified, IV checked, site marked, risks and benefits discussed, surgical consent, monitors and equipment checked, pre-op evaluation, timeout performed and anesthesia consent givenArterial  Skin Prep: chlorhexidine gluconate  Local Infiltration: lidocaine  Orientation: right  Location: radial    Catheter Size: 20 G  Catheter placement by Ultrasound guidance. Heme positive aspiration all ports.   Vessel Caliber: small, patent, compressibility normal  Needle advanced into vessel with real time Ultrasound guidance.  Guidewire confirmed in vessel.  Sterile sheath used.Insertion Attempts: 1  Assessment  Dressing: secured with tape and tegaderm and sutured in place and taped  Patient: Tolerated well

## 2023-03-18 NOTE — CARE UPDATE
03/18/23 0029   Patient Assessment/Suction   Level of Consciousness (AVPU) unresponsive   Respiratory Effort Normal   Expansion/Accessory Muscles/Retractions no use of accessory muscles   Rhythm/Pattern, Respiratory assisted mechanically   PRE-TX-O2   Device (Oxygen Therapy) ventilator   $ Is the patient on Low Flow Oxygen? Yes   Oxygen Concentration (%) 40   SpO2 100 %   Pulse Oximetry Type Continuous   Pulse (!) 142   Resp 18   BP (!) 135/102        Airway - Non-Surgical 03/17/23 2132   Placement Date/Time: 03/17/23 2132   Method of Intubation: Glidescope  Airway Device Size: 7.5  Style: Cuffed  Cuff Inflated With: Air  Secured at: Lips  Insertion attempts (enter comment if more than 2 attempts): 1   Secured at 23 cm   Measured At Lips   Secured Location Left   Secured by Commercial tube mccain   Bite Block none   Site Condition Cool;Dry   Status Intact;Secured;Patent   Site Assessment Clean;Dry;No bleeding;No drainage   Cuff Volume   (mlt)   Vent Select   Conventional Vent Y   Charged w/in last 24h YES   Preset Conventional Ventilator Settings   Vent ID [id number]   Vent Type    Ventilation Type VC   Vent Mode A/C   Humidity HME   Set Rate 30 BPM   Vt Set 500 mL   PEEP/CPAP 5 cmH20   Peak Flow 60 L/min   Peak End Inspiratory Pressure -17 cmH20   I-Trigger Type  V-TRIG   Trigger Sensitivity Flow/I-Trigger 3 L/min   Patient Ventilator Parameters   Resp Rate Total 35 br/min   Peak Airway Pressure 17 cmH20   Mean Airway Pressure 8 cmH20   Plateau Pressure 0 cmH20   Exhaled Vt 0 mL   Total Ve 15.7 L/m   I:E Ratio Measured 4.40:1   Auto PEEP 0 cmH20   Conventional Ventilator Alarms   Alarms On Y   Ve High Alarm 25 L/min   Ve Low Alarm 2.5 L/min   Vt High Alarm 1200 mL   Vt Low Alarm 150 mL   Resp Rate High Alarm 50 br/min   Press High Alarm 60 cmH2O   Apnea Rate 30   Apnea Volume (mL) 1 mL   Apnea Oxygen Concentration  100   Apnea Flow Rate (L/min) 60   T Apnea 20 sec(s)   Ready to Wean/Extubation Screen   FIO2<=50  (chart decimal) 0.4   MV<16L (chart vol.) 15.7   PEEP <=8 (chart #) 5   Vital Capacity   Vital Capacity (mL) 0   Education   $ Education Ventilator Oxygen;15 min   Respiratory Evaluation   $ Care Plan Tech Time 15 min

## 2023-03-18 NOTE — ED NOTES
Pt arrived ems from home. C/o overdose. Pt was found down with needle in leg. Unknown down time. Pt was coded in route. Given 1 epi and narcan 2.5. pt arrived postcode. Non responsive to verbal or tactile stimuli. Pupils fixed and dilated. Provider at bedside for intubation.

## 2023-03-18 NOTE — PROGRESS NOTES
Labs still pending, she is difficult stick and labs were drawn from a line.  Nurse reports urine output about 40 cc/hour, blood pressure is 90 systolic, we will increase IV fluids from 75 cc to 125 cc.  Nurse reports left arm is mottled with a dopplerable pulse.  We will get stat ultrasound arterial Doppler of left upper extremity, vascular csx, increase hydration as probable peripheral vasocontriction from hypothermia. Septic emboli is a differential, will wait for cultures and place on abx, check echo. She is almost at goal for hypothermia protocol.  Not any neurologic recovery thus far other than occasionally breathing over the vent.    CT CAP w/o PE. Possible ileus. Probable aspiration.

## 2023-03-18 NOTE — H&P
[facility]    History & Physical      Patient Name: Mishel Ruelas  MRN: [id number]  Admission Date: 3/17/2023  Attending Physician: Quinn Mckenna MD   Primary Care Provider: Primary Doctor No         Patient information was obtained from spouse/SO, parent, and ER records.     Subjective:     Principal Problem:Cardiac arrest    Chief Complaint:   Chief Complaint   Patient presents with    Drug Overdose     CARDIAC ARREST. LAST KNOWN ALIVE 2015.         HPI:  27-year-old female history of IV opioid abuse, seizures, recently released from longterm and was known to have been taking Xanax but otherwise clean.  She lives with her boyfriend and was noticed to have been in the bathroom for on unusually long period of time.  The boyfriend thought that she had had a seizure in the bathroom and had used a pin to open up the door to the bathroom.  When the boyfriend found the patient she was slumped over a sink and patient had a needle for drug use in her leg.  The boyfriend started CPR, when EMS arrived she was in asystole and 2 mg of Narcan given in 1 mg of epi.  She vomited when being bagged.  She had ROSC at the next pulse check.     In the ER patient was intubated, found to be hyperkalemic and EKG with peaked T-waves and treated with dextrose/insulin, calcium gluconate, bicarbonate for severe acidosis.    Last time seen conscious at  8:15 p.m.    Patient's mother Kristen Ruelas and SO EstivenJose Roberto were updated regarding critical illness and POC.     Review systems unavailable    Past Medical History:   Diagnosis Date    Seizures        Past Surgical History:   Procedure Laterality Date    APPENDECTOMY         Review of patient's allergies indicates:  No Known Allergies    No current facility-administered medications on file prior to encounter.     No current outpatient medications on file prior to encounter.     Family History       Problem Relation (Age of Onset)    Hypertension Father     Thyroid disease Mother          Tobacco Use    Smoking status: Some Days     Packs/day: 0.00     Types: Cigarettes    Smokeless tobacco: Never    Tobacco comments:     patient reports she only smokes 2-3 cigarettes a week    Substance and Sexual Activity    Alcohol use: Yes     Alcohol/week: 1.0 standard drink     Types: 1 Glasses of wine per week    Drug use: Not on file     Comment: SEE PREVIOUS ER VISITS    Sexual activity: Not on file     Review of Systems  Objective:     Vital Signs (Most Recent):  Pulse: 87 (03/18/23 0234)  Resp: (!) 26 (03/18/23 0234)  BP: (!) 135/102 (03/18/23 0029)  SpO2: 100 % (03/18/23 0234)   Vital Signs (24h Range):  Pulse:  [] 87  Resp:  [18-32] 26  SpO2:  [93 %-100 %] 100 %  BP: ()/() 135/102     Weight: 65 kg (143 lb 4.8 oz)  Body mass index is 23.85 kg/m².    Physical Exam  Constitutional:       Appearance: She is ill-appearing.      Comments: Intubated/sedated   HENT:      Head: Normocephalic and atraumatic.      Right Ear: External ear normal.      Left Ear: External ear normal.      Nose: Nose normal.      Mouth/Throat:      Mouth: Mucous membranes are dry.      Pharynx: Oropharynx is clear.   Eyes:      Comments: Pupils dilated unresponsive   Cardiovascular:      Rate and Rhythm: Tachycardia present.   Pulmonary:      Comments: Breathing over the vent  Abdominal:      General: Abdomen is flat. Bowel sounds are normal.      Palpations: Abdomen is soft.   Skin:     General: Skin is warm.      Capillary Refill: Capillary refill takes less than 2 seconds.   Neurological:      Comments: Does not move any extremities.  No gag, cough, corneal, dolls eye reflex, GCS 3.           Significant Labs: All pertinent labs within the past 24 hours have been reviewed.    Significant Imaging: I have reviewed all pertinent imaging results/findings within the past 24 hours.    Assessment/Plan:     Active Diagnoses:    Diagnosis Date Noted POA    PRINCIPAL PROBLEM:  Cardiac arrest  [I46.9] 03/18/2023 Unknown      Problems Resolved During this Admission:     VTE Risk Mitigation (From admission, onward)           Ordered     enoxaparin injection 40 mg  Daily         03/18/23 0032     IP VTE LOW RISK PATIENT  Once         03/17/23 2315                  #Cardiac arrest likely from hypoxia from opioid overdose, a needle was found in her leg  #Negative urine drug screen, fentanyl does not show up on our urine drug screens  -she is known to be drug screen negative while using opioids in the past according to her boyfriend  #anoxic brain injury  #Poor neurologic recovery so far, GCS 3, no brain stem reflexes detected  -d/w Dr Coleman Mckinney, recommends hypothermia protocol 33 degrees given severe anoxic brain injury  #Severe hyperkalemia secondary to acidosis with EKG changes  #NSTEMI  #Lactic acidosis  #Aspiration on CT chest  #Elevated D-dimer, no PE      -Ventilation support  -ABG and chest x-ray in the morning, frequent labs  -A-line/TLC  -EKG with some ST depressions, trend troponins, EKG, check echo  -Hypothermic protocol to 33 degrees given severe anoxic brain injury  -Neurology, Cardiology, critical Care consult  -EEG; CT  head w/o cerebral edema  -empiric Zosyn for aspiration  -nutrition consult for TFs  -Send serum drug screen  -Mother would like patient to be a organ donation candidate if deemed brain dead  -mother would like patient to remain full code for with the intent of organ harvesting, [facility] is here following along  -lovenox, protonix prophylaxis    Critical care time 40 minutes        Quinn Mckenna MD  Department of Hospital Medicine   [facility]

## 2023-03-18 NOTE — CONSULTS
[facility]  Department of Cardiology  Consult Note      PATIENT NAME: Mishel Ruelas    MRN: [id number]  TODAY'S DATE: 03/18/2023  ADMIT DATE: 3/17/2023                          CONSULT REQUESTED BY: Campbell Interiano MD    SUBJECTIVE     PRINCIPAL PROBLEM: Cardiac arrest      REASON FOR CONSULT:    From H&P:  27-year-old female history of IV opioid abuse, seizures, recently released from residential and was known to have been taking Xanax but otherwise clean.  She lives with her boyfriend and was noticed to have been in the bathroom for on unusually long period of time.  The boyfriend thought that she had had a seizure in the bathroom and had used a pin to open up the door to the bathroom.  When the boyfriend found the patient she was slumped over a sink and patient had a needle for drug use in her leg.  The boyfriend started CPR, when EMS arrived she was in asystole and 2 mg of Narcan given in 1 mg of epi.  She vomited when being bagged.  She had ROSC at the next pulse check.      In the ER patient was intubated, found to be hyperkalemic and EKG with peaked T-waves and treated with dextrose/insulin, calcium gluconate, bicarbonate for severe acidosis.     Last time seen conscious at  8:15 p.m.     Patient's mother Kristen Ruelas and SO EstivenJose Roberto were updated regarding critical illness and POC.      Review systems unavailable      HPI:  Patient is a 27-year-old female who was brought in by EMS after she was found down in her bathroom.  Her boyfriend apparently initiated CPR.  The EMS team found her to be asystole on arrival and administered 2 mg of Narcan and 1 mg of epinephrine.  Patient also apparently vomited while being bagged and is noted to have some concern for aspiration pneumonia on CTA of chest.  Patient obtained Rosc while being coded by EMS.  Patient was found to be hyperkalemic with severe acidosis on arrival.  She is intubated and in the ICU presently.  Unfortunately the  patient has not been responsive.  She is on mild sedation but is not paralyzed.  Pupils are fixed.  She has some mottling noted to bilateral upper extremities.  Her heart rate and rhythm are stable at the present time.        Review of patient's allergies indicates:  No Known Allergies    Past Medical History:   Diagnosis Date    Seizures      Past Surgical History:   Procedure Laterality Date    APPENDECTOMY       Social History     Tobacco Use    Smoking status: Some Days     Packs/day: 0.00     Types: Cigarettes    Smokeless tobacco: Never    Tobacco comments:     patient reports she only smokes 2-3 cigarettes a week    Substance Use Topics    Alcohol use: Yes     Alcohol/week: 1.0 standard drink     Types: 1 Glasses of wine per week        REVIEW OF SYSTEMS      OBJECTIVE     VITAL SIGNS (Most Recent)  Temp: (!) 91.4 °F (33 °C) (TTM achieved at 0700) (03/18/23 0700)  Pulse: 66 (03/18/23 1200)  Resp: (!) 30 (03/18/23 0630)  BP: 94/69 (03/18/23 1104)  SpO2: 100 % (03/18/23 1200)    VENTILATION STATUS  Resp: (!) 30 (03/18/23 0630)  SpO2: 100 % (03/18/23 1200)  Vent Mode: A/C  Oxygen Concentration (%):  [35-80] 35  Resp Rate Total:  [30 br/min-35 br/min] 30 br/min  Vt Set:  [500 mL] 500 mL  PEEP/CPAP:  [5 cmH20] 5 cmH20  Pressure Support:  [0 cmH20] 0 cmH20  Mean Airway Pressure:  [7.7 lkQ74-07 cmH20] 12 cmH20    I & O (Last 24H):  Intake/Output Summary (Last 24 hours) at 3/18/2023 1229  Last data filed at 3/17/2023 2343  Gross per 24 hour   Intake 1053.13 ml   Output --   Net 1053.13 ml       WEIGHTS  Wt Readings from Last 1 Encounters:   03/18/23 0735 65 kg (143 lb 4.8 oz)   03/17/23 2137 65 kg (143 lb 4.8 oz)       PHYSICAL EXAM  CONSTITUTIONAL: No fever, no chills  HEENT: Normocephalic, atraumatic, pupils fixed            NECK:  No JVD no carotid bruit  CVS: S1S2+, RRR  LUNGS: mechanical sounds noted from ventilator  ABDOMEN: Soft, NT, BS+  EXTREMITIES: mottling noted to both upper extremities   : No martin  catheter  NEURO: unresponsive     HOME MEDICATIONS:  No current facility-administered medications on file prior to encounter.     No current outpatient medications on file prior to encounter.       SCHEDULED MEDS:   acetylcysteine 200 mg/ml (20%)  600 mg Oral BID    ceFEPime (MAXIPIME) IVPB  2 g Intravenous Q8H    chlorhexidine  15 mL Mouth/Throat BID    enoxparin  40 mg Subcutaneous Daily    mupirocin   Nasal BID    pantoprazole  40 mg Intravenous Daily    potassium chloride  60 mEq Oral Once    sodium bicarbonate  100 mEq Intravenous Once    thiamine (VITAMIN B1) IVPB  100 mg Intravenous Daily    [START ON 3/19/2023] vancomycin (VANCOCIN) IVPB  1,250 mg Intravenous Q24H       CONTINUOUS INFUSIONS:   fentanyl 50 mcg/hr (03/18/23 0046)    NORepinephrine bitartrate-D5W Stopped (03/17/23 2200)    propofoL Stopped (03/18/23 1104)       PRN MEDS:calcium gluconate IVPB, calcium gluconate IVPB, calcium gluconate IVPB, dextrose 10%, dextrose 10%, glucagon (human recombinant), hydrALAZINE, insulin aspart U-100, magnesium sulfate IVPB, magnesium sulfate IVPB, potassium chloride in water **AND** potassium chloride in water **AND** potassium chloride in water, sodium chloride 0.9%, sodium phosphate IVPB, sodium phosphate IVPB, sodium phosphate IVPB, Pharmacy to dose Vancomycin consult **AND** vancomycin - pharmacy to dose    LABS AND DIAGNOSTICS     CBC LAST 3 DAYS  Recent Labs   Lab 03/17/23  2137 03/17/23  2150 03/18/23  0354 03/18/23  0616 03/18/23  1201   WBC 10.89  --   --  23.38*  --    RBC 4.26  --   --  5.13  --    HGB 14.2  --   --  16.9*  --    HCT 43.1   < > 50 48.4 49   *  --   --  94  --    MCH 33.3*  --   --  32.9*  --    MCHC 32.9  --   --  34.9  --    RDW 11.6  --   --  11.9  --      --   --  247  --    MPV 10.6  --   --  10.3  --    GRAN 50.6  5.5  --   --  88.1*  20.6*  --    LYMPH 42.8  4.7  --   --  5.2*  1.2  --    MONO 3.0*  0.3  --   --  5.6  1.3*  --    BASO 0.06  --   --  0.06  --     NRBC 0  --   --  0  --     < > = values in this interval not displayed.       COAGULATION LAST 3 DAYS  No results for input(s): LABPT, INR, APTT in the last 168 hours.    CHEMISTRY LAST 3 DAYS  Recent Labs   Lab 03/17/23 2137 03/17/23 2150 03/17/23  2322 03/18/23  0354 03/18/23  0616 03/18/23  1015 03/18/23  1201     --   --   --  138 134*  --    K 7.3*  --   --   --  2.3* 2.9*  --      --   --   --  111* 109  --    CO2 19*  --   --   --  11* 11*  --    ANIONGAP 14  --   --   --  16 14  --    BUN 19  --   --   --  31* 29*  --    CREATININE 1.2  --   --   --  1.2 1.2  --    *  --   --   --  355* 404*  --    CALCIUM 8.8  --   --   --  8.9 8.2*  --    PH  --    < > 7.332* 7.321*  --   --  7.247*   MG 2.5  --   --   --  2.7*  --   --    ALBUMIN 3.8  --   --   --  3.9 3.3*  --    PROT 6.9  --   --   --  6.9 6.2  --    ALKPHOS 91  --   --   --  79 62  --    ALT 75*  --   --   --  125* 111*  --    *  --   --   --  149* 108*  --    BILITOT 0.6  --   --   --  1.2* 1.3*  --     < > = values in this interval not displayed.       CARDIAC PROFILE LAST 3 DAYS  Recent Labs   Lab 03/17/23 2137 03/18/23  0102 03/18/23  0927   BNP 24  --   --    CPK 66  --   --    TROPONINIHS 22.8* 630.6*  630.6* 2112.4*       ENDOCRINE LAST 3 DAYS  Recent Labs   Lab 03/17/23 2139   PROCAL 0.08       LAST ARTERIAL BLOOD GAS  ABG  Recent Labs   Lab 03/18/23  1201   PH 7.247*   PO2 227*   PCO2 24.9*   HCO3 10.9*   BE -16       LAST 7 DAYS MICROBIOLOGY   Microbiology Results (last 7 days)       Procedure Component Value Units Date/Time    MRSA Screen by PCR [[id number]] Collected: 03/18/23 1137    Order Status: Sent Specimen: Nasopharyngeal Swab from Nasal Updated: 03/18/23 1144    Blood culture [[id number]]     Order Status: Sent Specimen: Blood             MOST RECENT IMAGING  X-Ray Chest 1 View  Reason: ETT placement    FINDINGS:    Portable chest with comparison chest x-ray March 17, 2023 show normal cardiomediastinal  silhouette. Endotracheal tube and nasogastric tube are unchanged. Right internal jugular central venous catheter tip is in the anatomic region of the SVC. No pneumothorax.  Patchy hazy lung opacities are noted in the right perihilar distribution. Pulmonary vasculature is normal. No acute osseous abnormality.    IMPRESSION:    1.  Lines and tubes as described.  2.  No pneumothorax.  3.  Right perihilar patchy hazy lung opacities could reflect asymmetric pulmonary edema versus infiltrate.    Electronically signed by:  Jamarcus Coleman DO  3/18/2023 9:45 AM CDT Workstation: [id number]  Echo  Addendum: · The left ventricle is normal in size with severely decreased systolic  function.   · The estimated ejection fraction is 25%.   · Grade I left ventricular diastolic dysfunction.   · Mild right ventricular enlargement with mildly reduced right ventricular  systolic function.   · Mild mitral regurgitation.  Narrative: · The left ventricle is normal in size with severely decreased systolic   function.  · The estimated ejection fraction is 25%.  · Grade I left ventricular diastolic dysfunction.  · Mild right ventricular enlargement with mildly reduced right ventricular   systolic function.  · Mild mitral regurgitation.     US Upper Extremity Arteries Left  REASON: pain    TECHNIQUE: Grayscale and color Doppler ultrasound of the left upper extremity arteries.    COMPARISON: None.    FINDINGS:    No color Doppler flow demonstrated in the left radial artery consistent with occlusion. There is monophasic blood flow in the left ulnar artery which is patent. The left subclavian, axillary, and brachial arteries are patent with normal peak systolic velocities.    IMPRESSION:    Occluded left peroneal artery.    Electronically signed by:  Jamarcus Coleman DO  3/18/2023 8:24 AM CDT Workstation: [id number]  X-Ray Chest AP Portable  EXAM DESCRIPTION: XR CHEST AP PORTABLE    CLINICAL HISTORY:27 years Female, Chest Pain    Comparison:  None    IMPRESSION:    Endotracheal tube terminating 3.7 cm above the evangelist.  Appropriate placement of nasogastric tube.    Right lung base opacity with small pleural effusion.  No pleural effusion. No pneumothorax.  Cardiomediastinal silhouette is within normal limits.  No acute osseous abnormality.    Electronically signed by:  Olu Robert DO  3/18/2023 1:19 AM CDT Workstation: [id number]  US Upper Extremity Veins Left  PROCEDURE:  US Duplex Left Upper Extremity Veins    CLINICAL INDICATION:  The patient is 27 years old and is Female; swelling    TECHNIQUE:  Real-time duplex ultrasound scan of the left upper extremity veins integrating B-mode two-dimensional vascular structure, Doppler spectral analysis, color flow Doppler imaging and compression.    COMPARISON:  None.    FINDINGS:  DEEP VEINS:  Unremarkable.  No DVT in the internal jugular, subclavian, axillary, or brachial veins.  The veins demonstrate normal color flow, are normally compressible, with normal phasic flow and/or augmentation response.  SUPERFICIAL VEINS:  Unremarkable.  No thrombus in the visualized basilic and cephalic veins.  SOFT TISSUES:  No acute findings.    IMPRESSION:    No left upper extremity DVT.    Electronically signed by:  Olu Robert DO  3/18/2023 1:18 AM CDT Workstation: [id number]  CT Abdomen Pelvis With Contrast  EXAM DESCRIPTION: CT ABDOMEN PELVIS WITH CONTRAST 3/18/2023 12:48 AM CDT    CLINICAL HISTORY: 27 years, Female, Sepsis    COMPARISON: None.    PROCEDURE:    Contrast-enhanced images of the abdomen and pelvis were performed utilizing 2 mm slice thickness at 2 mm interval reconstruction from the lung bases to the ischial tuberosities after the administration of IV contrast.  In addition multiplanar reformats in the coronal and sagittal plane were obtained and reviewed.  This exam was performed according to our departmental dose-optimization protocol, which includes automated exposure control, adjustment of the mA  and/or kV according to patient size and/or use of iterative reconstruction technique.    FINDINGS:    Evaluation of the chest in separate report.    There is a nasal gastric tube within the antrum of the stomach in good position.  There are dilated proximal small bowel loops extending down to the ileocecal valve and fecal residue and air involving the large bowel. No evidence for transition point could be identified and findings could correspond to perhaps ileus.    The liver, gallbladder, pancreas, spleen and adrenal glands demonstrate to be unremarkable, no focal lesions are noted.  The kidneys demonstrate normal uptake of contrast media. No evidence for nephrolithiasis and/or hydronephrosis.  The urinary bladder demonstrate the presence of a Acosta catheter.  The uterus is unremarkable. There are no adnexal masses.  The aorta demonstrate to be normal.  There is no retroperitoneal lymphadenopathy. There is no evidence for ascites/or significant abnormal fluid collection. The rest of the soft tissue and bony structures are within normal limits.    IMPRESSION:  Dilated proximal mid and distal small bowel loops extending down to the ileocecal valve with fluid and fecal residue as well as air involving the large bowel. No evidence for transition point could be identified and findings could correspond to the lytic the ileus.    NG tube and Acosta catheter in good position.    Electronically signed by:  Randall Diaz MD  3/18/2023 12:50 AM CDT Workstation: [id number]  CTA Chest Non-Coronary (PE Studies)  EXAM DESCRIPTION: CTA CHEST NON CORONARY (PE STUDIES) 3/18/2023 12:41 AM CDT    CLINICAL HISTORY: 27 years, Female, Pulmonary embolism (PE) suspected, high prob    COMPARISON: None    PROCEDURE:  Multiple transaxial tomograms of the chest were obtained from the lung apices through the lung bases utilizing 1 mm slice thickness at 1 mm interval reconstruction after the administration of 100 cc of Omnipaque 350 for complete  opacification of the pulmonary arteries.  Subsequent 3-D maximum intensity projection images were generated in the coronal and sagittal plane for review.  This exam was performed according to our departmental dose-optimization protocol, which includes automated exposure control, adjustment of the mA and/or kV according to patient size and/or use of iterative reconstruction technique.    FINDINGS:  There is a endotracheal tube and nasogastric tube in good position  The lungs parenchyma demonstrate small focal area of tree-in-bud nodularity involving the apical segment and anterior segment right upper lobe, minimally along the perihilar posterior segment right lower lobe and minimally posterior segment left lower lobe suspicious for focal areas of infection/inflammation, aspiration could be of consideration. No significant masses and/or nodules are identified.  The trachea mainstem bronchus demonstrate to be normal. There is no significant pericardial or pleural effusions.  The thoracic aorta demonstrate to be unremarkable. The heart is normal in size. There are no significant coronary artery calcifications.  There is no significant mediastinal and/or hilar lymphadenopathy. The axillary regions demonstrate to be clear.  Pulmonary arteries demonstrate to be normal, no intraluminal defect are seen that would suggest pulmonary embolus.  The bone windows demonstrate to be unremarkable.  Evaluation of the abdomen will be given in separate report.    IMPRESSION:  No CT evidence of pulmonary embolus.    Endotracheal tube and nasogastric tube in good position.    There is a endotracheal tube and nasogastric tube in good position    The lungs parenchyma demonstrate small focal area of tree-in-bud nodularity involving the apical segment and anterior segment right upper lobe, minimally along the perihilar posterior segment right lower lobe and minimally posterior segment left lower lobe suspicious for focal areas of  infection/inflammation, aspiration could be of consideration.    Electronically signed by:  Randall Diaz MD  3/18/2023 12:48 AM CDT Workstation: [id number]      ECHOCARDIOGRAM RESULTS (last 5)  Results for orders placed during the hospital encounter of 03/17/23    Echo    Interpretation Summary  · The left ventricle is normal in size with severely decreased systolic function.  · The estimated ejection fraction is 25%.  · Grade I left ventricular diastolic dysfunction.  · Mild right ventricular enlargement with mildly reduced right ventricular systolic function.  · Mild mitral regurgitation.      CURRENT/PREVIOUS VISIT EKG  Results for orders placed or performed during the hospital encounter of 03/17/23   EKG 12-lead    Collection Time: 03/17/23 11:22 PM    Narrative    Test Reason : R07.9,    Vent. Rate : 147 BPM     Atrial Rate : 147 BPM     P-R Int : 122 ms          QRS Dur : 078 ms      QT Int : 260 ms       P-R-T Axes : 082 081 084 degrees     QTc Int : 406 ms    Sinus tachycardia  Biatrial enlargement  Marked ST abnormality, possible inferior subendocardial injury  Abnormal ECG  When compared with ECG of 17-MAR-2023 21:38,  ST more depressed in Inferior leads  Nonspecific T wave abnormality now evident in Lateral leads    Referred By: [email or web address]   SELF           Confirmed By:            ASSESSMENT/PLAN:     Active Hospital Problems    Diagnosis    *Cardiac arrest    Acute hypoxemic respiratory failure    Lactic acidosis    Hypophosphatemia    Hypokalemia    Leukocytosis    Transaminitis    Acute systolic congestive heart failure       ASSESSMENT & PLAN:     Cardiopulmonary arrest  Acute hypoxemic respiratory failrue  Acute LV dysfunction EF 25%  Hyperkalemic- now hypokalemic  Lactic acidosis  Leukocytosis  IV drug abuse       RECOMMENDATIONS:    Prognosis is poor. Nurse at bedside states patient's mother is aware.   Palliative medicine has been consulted. In the meantime, she is receiving supportive care.  Currently her heart rate and rhythm are stable.   Will be available PRN this weekend. Please notify us if her clinical picture changes.         Jacki Huff NP  [facility]  Date of Service: 03/18/2023  12:29 PM     I have personally interviewed and examined the patient. I have reviewed the Nurse Practitioner's history and physical, assessment, and plan. I agree with the findings and made appropriate changes as necessary in recommendations.  1. Cardiopulmonary arrest with severe hypoxic brain injury and pupils are not reactive to light.  Not much to add at this point of time.  Will be available as needed.      Willie Bernal MD  Department of Cardiology  [facility]  03/18/2023 12:39 PM

## 2023-03-18 NOTE — CONSULTS
[facility]  Department of Neurology  Neurology Consultation Note        PATIENT NAME: Mishel Ruelas  MRN: [id number]  CSN: [id number]      TODAY'S DATE: 03/18/2023  ADMIT DATE: 3/17/2023                            CONSULTING PROVIDER: Prem Downey MD  CONSULT REQUESTED BY: Campbell Interiano MD      Reason for consult: Encephalopathy and Neuro prognosis       History obtained from chart review.    HPI per EMR: Mishel Ruelas is a 27 y.o. female with a history of IV opioid abuse, seizures, recently released from [facility] and was known to have been taking Xanax but otherwise clean.  She lives with her boyfriend and was noticed to have been in the bathroom for on unusually long period of time.  The boyfriend thought that she had had a seizure in the bathroom and had used a pin to open up the door to the bathroom.  When the boyfriend found the patient she was slumped over a sink and patient had a needle for drug use in her leg.  The boyfriend started CPR, when EMS arrived she was in asystole and 2 mg of Narcan given in 1 mg of epi.  She vomited when being bagged.  She had ROSC at the next pulse check.    In the ER patient was intubated, found to be hyperkalemic and EKG with peaked T-waves and treated with dextrose/insulin, calcium gluconate, bicarbonate for severe acidosis.    Neurology consult: Patient was seen and examined by me.  Patient remains to be intubated, sedated with fentanyl and propofol.  Ongoing hypothermia protocol.  Per patient's nurse, she does not respond to any stimulus.     PREVIOUS MEDICAL HISTORY:  Past Medical History:   Diagnosis Date    Seizures      PREVIOUS SURGICAL HISTORY:  Past Surgical History:   Procedure Laterality Date    APPENDECTOMY       FAMILY MEDICAL HISTORY:  Family History   Problem Relation Age of Onset    Thyroid disease Mother     Hypertension Father      SOCIAL HISTORY:  Social History     Tobacco Use    Smoking status: Some Days      "Packs/day: 0.00     Types: Cigarettes    Smokeless tobacco: Never    Tobacco comments:     patient reports she only smokes 2-3 cigarettes a week    Substance Use Topics    Alcohol use: Yes     Alcohol/week: 1.0 standard drink     Types: 1 Glasses of wine per week     ALLERGIES:  Review of patient's allergies indicates:  No Known Allergies  HOME MEDICATIONS:  Prior to Admission medications    Not on File     CURRENT SCHEDULED MEDICATIONS:   acetylcysteine 200 mg/ml (20%)  600 mg Oral BID    ceFEPime (MAXIPIME) IVPB  2 g Intravenous Q8H    enoxparin  40 mg Subcutaneous Daily    pantoprazole  40 mg Intravenous Daily    potassium chloride  60 mEq Oral Once    thiamine (VITAMIN B1) IVPB  100 mg Intravenous Daily    vancomycin (VANCOCIN) IVPB  25 mg/kg Intravenous Once    [START ON 3/19/2023] vancomycin (VANCOCIN) IVPB  1,250 mg Intravenous Q24H     CURRENT INFUSIONS:   sodium chloride 0.9% 150 mL/hr at 03/18/23 0718    fentanyl 50 mcg/hr (03/18/23 0046)    NORepinephrine bitartrate-D5W Stopped (03/17/23 2200)    propofoL 40 mcg/kg/min (03/18/23 0815)     CURRENT PRN MEDICATIONS:  dextrose 10%, dextrose 10%, glucagon (human recombinant), hydrALAZINE, insulin aspart U-100, sodium chloride 0.9%, Pharmacy to dose Vancomycin consult **AND** vancomycin - pharmacy to dose    REVIEW OF SYSTEMS:  A review of systems cannot be obtained as the patient is currently sedated and intubated.       PHYSICAL EXAM:  Patient Vitals for the past 24 hrs:   BP Temp Temp src Pulse Resp SpO2 Height Weight   03/18/23 0923 -- -- -- 68 -- 100 % -- --   03/18/23 0900 95/65 -- -- 104 -- 100 % -- --   03/18/23 0845 -- -- -- 69 -- 100 % -- --   03/18/23 0830 -- -- -- 69 -- 100 % -- --   03/18/23 0815 -- -- -- 69 -- 100 % -- --   03/18/23 0800 91/68 -- -- 69 -- 100 % -- --   03/18/23 0755 91/68 -- -- 70 -- 100 % -- --   03/18/23 0735 -- -- -- -- -- -- 5' 5" (1.651 m) 65 kg (143 lb 4.8 oz)   03/18/23 0721 -- -- -- 72 -- 100 % -- --   03/18/23 0700 94/68 " "(!) 91.4 °F (33 °C) Esophageal 75 -- 100 % -- --   03/18/23 0645 -- -- -- 78 -- 100 % -- --   03/18/23 0630 -- (!) 94.9 °F (34.9 °C) Rectal 78 (!) 30 100 % -- --   03/18/23 0615 -- -- -- 75 (!) 30 100 % -- --   03/18/23 0600 101/76 (!) 94.9 °F (34.9 °C) Rectal 75 (!) 30 100 % -- --   03/18/23 0545 -- -- -- 77 11 100 % -- --   03/18/23 0530 -- (!) 95.2 °F (35.1 °C) Rectal 77 (!) 30 100 % -- --   03/18/23 0500 99/72 -- -- 74 (!) 30 99 % -- --   03/18/23 0400 111/88 (!) 95.5 °F (35.3 °C) Rectal 79 (!) 35 100 % -- --   03/18/23 0354 -- -- -- 78 (!) 34 99 % -- --   03/18/23 0300 117/82 97.6 °F (36.4 °C) Rectal 93 (!) 31 100 % -- --   03/18/23 0234 -- -- -- 87 (!) 26 100 % -- --   03/18/23 0200 120/87 98.4 °F (36.9 °C) Rectal 88 (!) 29 100 % -- --   03/18/23 0100 (!) 134/94 -- -- (!) 124 (!) 29 100 % -- --   03/18/23 0035 -- -- -- -- -- -- 5' 5" (1.651 m) --   03/18/23 0029 (!) 135/102 97.6 °F (36.4 °C) Rectal (!) 142 18 100 % -- --   03/18/23 0001 -- -- -- (!) 147 (!) 28 100 % -- --   03/18/23 0000 (!) 145/68 -- -- (!) 146 (!) 27 100 % -- --   03/17/23 2358 -- -- -- (!) 147 (!) 27 100 % -- --   03/17/23 2315 -- -- -- -- (!) 30 -- -- --   03/17/23 2309 -- -- -- (!) 149 (!) 29 99 % -- --   03/17/23 2258 (!) 177/112 -- -- (!) 144 (!) 27 99 % -- --   03/17/23 2252 -- -- -- (!) 143 (!) 27 99 % -- --   03/17/23 2239 -- -- -- (!) 152 (!) 30 -- -- --   03/17/23 2237 -- -- -- (!) 150 (!) 26 98 % -- --   03/17/23 2207 (!) 169/112 -- -- (!) 149 (!) 26 97 % -- --   03/17/23 2200 (!) 162/113 -- -- (!) 151 (!) 27 98 % -- --   03/17/23 2158 (!) 146/72 -- -- (!) 153 (!) 28 97 % -- --   03/17/23 2152 (!) 170/85 -- -- (!) 152 (!) 27 98 % -- --   03/17/23 2140 (!) 102/59 -- -- (!) 152 20 (!) 94 % -- --   03/17/23 2137 -- -- -- -- -- -- -- 65 kg (143 lb 4.8 oz)   03/17/23 2137 (!) 84/55 -- -- (!) 152 (!) 32 (!) 93 % -- --   03/17/23 2136 (!) 84/55 -- -- -- -- -- -- --   03/17/23 2134 -- -- -- (!) 152 -- 96 % -- --   03/17/23 2131 -- -- -- " (!) 140 -- -- -- --     General appearance:  Intubated, sedated.  Cardiovascular: S1+ S2+  Pulmonary:  Intubated on AC  GI:  Soft      NEUROLOGICAL EXAM:    Mental status:  Intubated, sedated.  Does not open eyes to any stimulus.  Does not follow commands          Intubated: yes          Sedated: yes  Response to noxious stimulation: no  Breathes above ventilator: no  Opens eyes: no  Pupils: sluggishly symmetric          Left: reactive          Right: reactive  Eye movements: No pathologic movements such as nystagmus, ocular bobbing, dipping or roving.  Corneal reflex: absent  Oculocephalic reflex: absent  Cough: absent  Gag: absent  Motor exam: Normal muscle tone. Normal muscle bulk. No abnormal movements such as tremors, myoclonus or twitching.  DTRs: diminished throughout. Babinski response absent.      Labs:  Recent Labs   Lab 03/17/23 2137 03/18/23  0616    138   K 7.3* 2.3*    111*   CO2 19* 11*   BUN 19 31*   CREATININE 1.2 1.2   * 355*   CALCIUM 8.8 8.9   PHOS  --  <1.0*   MG 2.5 2.7*     Recent Labs   Lab 03/17/23 2137 03/17/23  2150 03/17/23  2322 03/18/23  0354 03/18/23  0616   WBC 10.89  --   --   --  23.38*   HGB 14.2  --   --   --  16.9*   HCT 43.1   < > 48 50 48.4     --   --   --  247    < > = values in this interval not displayed.     Recent Labs   Lab 03/17/23 2137 03/18/23  0616   ALBUMIN 3.8 3.9   PROT 6.9 6.9   BILITOT 0.6 1.2*   ALKPHOS 91 79   ALT 75* 125*   * 149*     No results found for: PT, PTT, INR  No results found for: CHOLTOT, TRIG, HDL, CHOLHDL, LDL  No results found for: HGBA1C  No results found for: PROTEINCSF, GLUCCSF, WBCCSF    Imaging:  I have reviewed and interpreted the pertinent imaging and lab results.      Echo  Addendum: · The left ventricle is normal in size with severely decreased systolic  function.   · The estimated ejection fraction is 25%.   · Grade I left ventricular diastolic dysfunction.   · Mild right ventricular enlargement  with mildly reduced right ventricular  systolic function.   · Mild mitral regurgitation.  Narrative: · The left ventricle is normal in size with severely decreased systolic   function.  · The estimated ejection fraction is 25%.  · Grade I left ventricular diastolic dysfunction.  · Mild right ventricular enlargement with mildly reduced right ventricular   systolic function.  · Mild mitral regurgitation.     US Upper Extremity Arteries Left  REASON: pain    TECHNIQUE: Grayscale and color Doppler ultrasound of the left upper extremity arteries.    COMPARISON: None.    FINDINGS:    No color Doppler flow demonstrated in the left radial artery consistent with occlusion. There is monophasic blood flow in the left ulnar artery which is patent. The left subclavian, axillary, and brachial arteries are patent with normal peak systolic velocities.    IMPRESSION:    Occluded left peroneal artery.    Electronically signed by:  Jamarcus Coleman DO  3/18/2023 8:24 AM CDT Workstation: [id number]  X-Ray Chest AP Portable  EXAM DESCRIPTION: XR CHEST AP PORTABLE    CLINICAL HISTORY:27 years Female, Chest Pain    Comparison: None    IMPRESSION:    Endotracheal tube terminating 3.7 cm above the evangelist.  Appropriate placement of nasogastric tube.    Right lung base opacity with small pleural effusion.  No pleural effusion. No pneumothorax.  Cardiomediastinal silhouette is within normal limits.  No acute osseous abnormality.    Electronically signed by:  Olu Robert DO  3/18/2023 1:19 AM CDT Workstation: [id number]  US Upper Extremity Veins Left  PROCEDURE:  US Duplex Left Upper Extremity Veins    CLINICAL INDICATION:  The patient is 27 years old and is Female; swelling    TECHNIQUE:  Real-time duplex ultrasound scan of the left upper extremity veins integrating B-mode two-dimensional vascular structure, Doppler spectral analysis, color flow Doppler imaging and compression.    COMPARISON:  None.    FINDINGS:  DEEP VEINS:  Unremarkable.   No DVT in the internal jugular, subclavian, axillary, or brachial veins.  The veins demonstrate normal color flow, are normally compressible, with normal phasic flow and/or augmentation response.  SUPERFICIAL VEINS:  Unremarkable.  No thrombus in the visualized basilic and cephalic veins.  SOFT TISSUES:  No acute findings.    IMPRESSION:    No left upper extremity DVT.    Electronically signed by:  Olu Robert DO  3/18/2023 1:18 AM CDT Workstation: [id number]  CT Abdomen Pelvis With Contrast  EXAM DESCRIPTION: CT ABDOMEN PELVIS WITH CONTRAST 3/18/2023 12:48 AM CDT    CLINICAL HISTORY: 27 years, Female, Sepsis    COMPARISON: None.    PROCEDURE:    Contrast-enhanced images of the abdomen and pelvis were performed utilizing 2 mm slice thickness at 2 mm interval reconstruction from the lung bases to the ischial tuberosities after the administration of IV contrast.  In addition multiplanar reformats in the coronal and sagittal plane were obtained and reviewed.  This exam was performed according to our departmental dose-optimization protocol, which includes automated exposure control, adjustment of the mA and/or kV according to patient size and/or use of iterative reconstruction technique.    FINDINGS:    Evaluation of the chest in separate report.    There is a nasal gastric tube within the antrum of the stomach in good position.  There are dilated proximal small bowel loops extending down to the ileocecal valve and fecal residue and air involving the large bowel. No evidence for transition point could be identified and findings could correspond to perhaps ileus.    The liver, gallbladder, pancreas, spleen and adrenal glands demonstrate to be unremarkable, no focal lesions are noted.  The kidneys demonstrate normal uptake of contrast media. No evidence for nephrolithiasis and/or hydronephrosis.  The urinary bladder demonstrate the presence of a Acosta catheter.  The uterus is unremarkable. There are no adnexal masses.   The aorta demonstrate to be normal.  There is no retroperitoneal lymphadenopathy. There is no evidence for ascites/or significant abnormal fluid collection. The rest of the soft tissue and bony structures are within normal limits.    IMPRESSION:  Dilated proximal mid and distal small bowel loops extending down to the ileocecal valve with fluid and fecal residue as well as air involving the large bowel. No evidence for transition point could be identified and findings could correspond to the lytic the ileus.    NG tube and Acosta catheter in good position.    Electronically signed by:  Randall Diaz MD  3/18/2023 12:50 AM CDT Workstation: [id number]  CTA Chest Non-Coronary (PE Studies)  EXAM DESCRIPTION: CTA CHEST NON CORONARY (PE STUDIES) 3/18/2023 12:41 AM CDT    CLINICAL HISTORY: 27 years, Female, Pulmonary embolism (PE) suspected, high prob    COMPARISON: None    PROCEDURE:  Multiple transaxial tomograms of the chest were obtained from the lung apices through the lung bases utilizing 1 mm slice thickness at 1 mm interval reconstruction after the administration of 100 cc of Omnipaque 350 for complete opacification of the pulmonary arteries.  Subsequent 3-D maximum intensity projection images were generated in the coronal and sagittal plane for review.  This exam was performed according to our departmental dose-optimization protocol, which includes automated exposure control, adjustment of the mA and/or kV according to patient size and/or use of iterative reconstruction technique.    FINDINGS:  There is a endotracheal tube and nasogastric tube in good position  The lungs parenchyma demonstrate small focal area of tree-in-bud nodularity involving the apical segment and anterior segment right upper lobe, minimally along the perihilar posterior segment right lower lobe and minimally posterior segment left lower lobe suspicious for focal areas of infection/inflammation, aspiration could be of consideration. No  significant masses and/or nodules are identified.  The trachea mainstem bronchus demonstrate to be normal. There is no significant pericardial or pleural effusions.  The thoracic aorta demonstrate to be unremarkable. The heart is normal in size. There are no significant coronary artery calcifications.  There is no significant mediastinal and/or hilar lymphadenopathy. The axillary regions demonstrate to be clear.  Pulmonary arteries demonstrate to be normal, no intraluminal defect are seen that would suggest pulmonary embolus.  The bone windows demonstrate to be unremarkable.  Evaluation of the abdomen will be given in separate report.    IMPRESSION:  No CT evidence of pulmonary embolus.    Endotracheal tube and nasogastric tube in good position.    There is a endotracheal tube and nasogastric tube in good position    The lungs parenchyma demonstrate small focal area of tree-in-bud nodularity involving the apical segment and anterior segment right upper lobe, minimally along the perihilar posterior segment right lower lobe and minimally posterior segment left lower lobe suspicious for focal areas of infection/inflammation, aspiration could be of consideration.    Electronically signed by:  Randall Diaz MD  3/18/2023 12:48 AM CDT Workstation: [id number]         ASSESSMENT & PLAN:    Cardiac arrest  Drug abuse      Plan:   Patient admitted to the hospital after cardiac arrest likely secondary to drug use.  Neurology consulted for prognostication.  On exam, patient has only sluggish pupillary response and no other brainstem reflexes were present.  Currently on therapeutic hypothermia protocol with core temperature at 33° C.  CT head was done on arrival showed no acute abnormalities.  Will need to wait until patient is rewarmed to get appropriate neuro exam to better prognosticate.  Continuous EEG in place shows low voltage diffuse slowing without any seizures or epileptiform activity.  Primary team to discuss with  family regarding prognosis and goals of care.  After rewarming, if no improvement in exam, will consider MRI brain  Will follow        Thank you kindly for including us in the care of this patient. Please do not hesitate to contact us with any questions.      Critical Care:  46 minutes of critical care time has been spent evaluating with the patient. Time includes chart review not limited to diagnostic imaging, labs, and vitals, patient assessment, discussion with family and nursing, current order evaluations, and new order entries.       Prem Downey MD  Neurology/vascular Neurology  Date of Service: 03/18/2023  9:38 AM    --------------------------------------------------------------------------------------------------------------------------------------------------------------------------------------------------------------------------------------------------------------  Please note: This note was transcribed using voice recognition software. Because of this technology there are often uinintended grammatical, spelling, and other transcription errors. Please disregard these errors.

## 2023-03-18 NOTE — ANESTHESIA PROCEDURE NOTES
Central Line    Diagnosis: s/p cardiac arrest  Patient location during procedure: ICU  Timeout: 3/18/2023 5:30 AM  Procedure end time: 3/18/2023 5:40 AM    Staffing  Authorizing Provider: Ayo Duque Jr., MD  Performing Provider: Ayo Duque Jr., MD    Staffing  Performed: anesthesiologist   Anesthesiologist: Ayo Duque Jr., MD  Anesthesiologist was present at the time of the procedure.  Preanesthetic Checklist  Completed: patient identified, IV checked, site marked, risks and benefits discussed, surgical consent, monitors and equipment checked, pre-op evaluation, timeout performed and anesthesia consent given  Indication   Indication: hemodynamic monitoring, vascular access, med administration     Anesthesia   local infiltration    Central Line   Skin Prep: skin prepped with ChloraPrep, skin prep agent completely dried prior to procedure  Sterile Barriers Followed: Yes    All five maximal barriers used- gloves, gown, cap, mask, and large sterile sheet    hand hygiene performed prior to central venous catheter insertion  Location: right internal jugular.   Catheter type: triple lumen  Catheter Size: 7 Fr  Inserted Catheter Length: 15 cm  Ultrasound: vascular probe with ultrasound   Vessel Caliber: medium, patent, compressibility normal  Needle advanced into vessel with real time Ultrasound guidance.  Guidewire confirmed in vessel.  sterile gel and probe cover used in ultrasound-guided central venous catheter insertion  Manometry: none  Insertion Attempts: 1   Securement:line sutured, chlorhexidine patch, sterile dressing applied and blood return through all ports    Post-Procedure   X-Ray: no pneumothorax on x-ray, placement verified by x-ray, tip termination and successful placement  Tip termination comments: SVC   Adverse Events:none      Guidewire Guidewire removed intact. Guidewire removed intact, verified with nurse.

## 2023-03-19 LAB
ABO + RH BLD: NORMAL
ADENOVIRUS: NOT DETECTED
ALBUMIN SERPL BCP-MCNC: 2.9 G/DL (ref 3.5–5.2)
ALBUMIN SERPL BCP-MCNC: 3.1 G/DL (ref 3.5–5.2)
ALBUMIN SERPL BCP-MCNC: 3.3 G/DL (ref 3.5–5.2)
ALBUMIN SERPL BCP-MCNC: 3.4 G/DL (ref 3.5–5.2)
ALLENS TEST: ABNORMAL
ALP SERPL-CCNC: 46 U/L (ref 55–135)
ALP SERPL-CCNC: 48 U/L (ref 55–135)
ALP SERPL-CCNC: 52 U/L (ref 55–135)
ALP SERPL-CCNC: 57 U/L (ref 55–135)
ALT SERPL W/O P-5'-P-CCNC: 50 U/L (ref 10–44)
ALT SERPL W/O P-5'-P-CCNC: 65 U/L (ref 10–44)
ALT SERPL W/O P-5'-P-CCNC: 65 U/L (ref 10–44)
ALT SERPL W/O P-5'-P-CCNC: 81 U/L (ref 10–44)
AMYLASE SERPL-CCNC: 104 U/L (ref 20–110)
AMYLASE SERPL-CCNC: 136 U/L (ref 20–110)
ANION GAP SERPL CALC-SCNC: 13 MMOL/L (ref 8–16)
ANION GAP SERPL CALC-SCNC: 14 MMOL/L (ref 8–16)
ANION GAP SERPL CALC-SCNC: 14 MMOL/L (ref 8–16)
ANION GAP SERPL CALC-SCNC: 8 MMOL/L (ref 8–16)
APTT BLDCRRT: 40.8 SEC (ref 21–32)
APTT BLDCRRT: 43 SEC (ref 21–32)
AST SERPL-CCNC: 108 U/L (ref 10–40)
AST SERPL-CCNC: 117 U/L (ref 10–40)
AST SERPL-CCNC: 125 U/L (ref 10–40)
AST SERPL-CCNC: 83 U/L (ref 10–40)
BACTERIA #/AREA URNS HPF: NEGATIVE /HPF
BASOPHILS # BLD AUTO: 0.01 K/UL (ref 0–0.2)
BASOPHILS # BLD AUTO: 0.02 K/UL (ref 0–0.2)
BASOPHILS NFR BLD: 0.1 % (ref 0–1.9)
BASOPHILS NFR BLD: 0.2 % (ref 0–1.9)
BILIRUB DIRECT SERPL-MCNC: 0.2 MG/DL (ref 0.1–0.3)
BILIRUB DIRECT SERPL-MCNC: 0.2 MG/DL (ref 0.1–0.3)
BILIRUB SERPL-MCNC: 0.4 MG/DL (ref 0.1–1)
BILIRUB SERPL-MCNC: 0.7 MG/DL (ref 0.1–1)
BILIRUB SERPL-MCNC: 0.8 MG/DL (ref 0.1–1)
BILIRUB SERPL-MCNC: 0.9 MG/DL (ref 0.1–1)
BILIRUB UR QL STRIP: NEGATIVE
BORDETELLA PARAPERTUSSIS (IS1001): NOT DETECTED
BORDETELLA PERTUSSIS (PTXP): NOT DETECTED
BUN SERPL-MCNC: 14 MG/DL (ref 6–20)
BUN SERPL-MCNC: 16 MG/DL (ref 6–20)
BUN SERPL-MCNC: 19 MG/DL (ref 6–20)
BUN SERPL-MCNC: 23 MG/DL (ref 6–20)
CALCIUM SERPL-MCNC: 6.8 MG/DL (ref 8.7–10.5)
CALCIUM SERPL-MCNC: 6.9 MG/DL (ref 8.7–10.5)
CALCIUM SERPL-MCNC: 7.1 MG/DL (ref 8.7–10.5)
CALCIUM SERPL-MCNC: 7.2 MG/DL (ref 8.7–10.5)
CHLAMYDIA PNEUMONIAE: NOT DETECTED
CHLORIDE SERPL-SCNC: 104 MMOL/L (ref 95–110)
CHLORIDE SERPL-SCNC: 104 MMOL/L (ref 95–110)
CHLORIDE SERPL-SCNC: 106 MMOL/L (ref 95–110)
CHLORIDE SERPL-SCNC: 108 MMOL/L (ref 95–110)
CK MB SERPL-MCNC: 49 NG/ML (ref 0.1–6.5)
CK MB SERPL-MCNC: 62.3 NG/ML (ref 0.1–6.5)
CLARITY UR: CLEAR
CO2 SERPL-SCNC: 17 MMOL/L (ref 23–29)
CO2 SERPL-SCNC: 18 MMOL/L (ref 23–29)
CO2 SERPL-SCNC: 19 MMOL/L (ref 23–29)
CO2 SERPL-SCNC: 21 MMOL/L (ref 23–29)
COLOR UR: YELLOW
CORONAVIRUS 229E, COMMON COLD VIRUS: NOT DETECTED
CORONAVIRUS HKU1, COMMON COLD VIRUS: NOT DETECTED
CORONAVIRUS NL63, COMMON COLD VIRUS: NOT DETECTED
CORONAVIRUS OC43, COMMON COLD VIRUS: NOT DETECTED
CREAT SERPL-MCNC: 0.5 MG/DL (ref 0.5–1.4)
CREAT SERPL-MCNC: 0.7 MG/DL (ref 0.5–1.4)
DELSYS: ABNORMAL
DIFFERENTIAL METHOD: ABNORMAL
EOSINOPHIL # BLD AUTO: 0 K/UL (ref 0–0.5)
EOSINOPHIL NFR BLD: 0 % (ref 0–8)
ERYTHROCYTE [DISTWIDTH] IN BLOOD BY AUTOMATED COUNT: 11.8 % (ref 11.5–14.5)
ERYTHROCYTE [DISTWIDTH] IN BLOOD BY AUTOMATED COUNT: 11.9 % (ref 11.5–14.5)
ERYTHROCYTE [DISTWIDTH] IN BLOOD BY AUTOMATED COUNT: 11.9 % (ref 11.5–14.5)
ERYTHROCYTE [DISTWIDTH] IN BLOOD BY AUTOMATED COUNT: 12 % (ref 11.5–14.5)
ERYTHROCYTE [SEDIMENTATION RATE] IN BLOOD BY WESTERGREN METHOD: 25 MM/H
ERYTHROCYTE [SEDIMENTATION RATE] IN BLOOD BY WESTERGREN METHOD: 30 MM/H
EST. GFR  (NO RACE VARIABLE): >60 ML/MIN/1.73 M^2
FIO2: 100
FIO2: 100
FIO2: 25
FIO2: 35
FLUBV RNA NPH QL NAA+NON-PROBE: NOT DETECTED
GGT SERPL-CCNC: 22 U/L (ref 8–55)
GGT SERPL-CCNC: 29 U/L (ref 8–55)
GLUCOSE SERPL-MCNC: 130 MG/DL (ref 70–110)
GLUCOSE SERPL-MCNC: 133 MG/DL (ref 70–110)
GLUCOSE SERPL-MCNC: 136 MG/DL (ref 70–110)
GLUCOSE SERPL-MCNC: 141 MG/DL (ref 70–110)
GLUCOSE SERPL-MCNC: 144 MG/DL (ref 70–110)
GLUCOSE SERPL-MCNC: 218 MG/DL (ref 70–110)
GLUCOSE SERPL-MCNC: 225 MG/DL (ref 70–110)
GLUCOSE SERPL-MCNC: 74 MG/DL (ref 70–110)
GLUCOSE SERPL-MCNC: 76 MG/DL (ref 70–110)
GLUCOSE UR QL STRIP: NEGATIVE
HCO3 UR-SCNC: 20.5 MMOL/L (ref 24–28)
HCO3 UR-SCNC: 21.8 MMOL/L (ref 24–28)
HCO3 UR-SCNC: 23.5 MMOL/L (ref 24–28)
HCO3 UR-SCNC: 27 MMOL/L (ref 24–28)
HCT VFR BLD AUTO: 31 % (ref 37–48.5)
HCT VFR BLD AUTO: 35.2 % (ref 37–48.5)
HCT VFR BLD AUTO: 38.5 % (ref 37–48.5)
HCT VFR BLD AUTO: 39 % (ref 37–48.5)
HCT VFR BLD CALC: 36 %PCV (ref 36–54)
HGB BLD-MCNC: 11.2 G/DL (ref 12–16)
HGB BLD-MCNC: 12.8 G/DL (ref 12–16)
HGB BLD-MCNC: 13.9 G/DL (ref 12–16)
HGB BLD-MCNC: 14.4 G/DL (ref 12–16)
HGB UR QL STRIP: NEGATIVE
HPIV1 RNA NPH QL NAA+NON-PROBE: NOT DETECTED
HPIV2 RNA NPH QL NAA+NON-PROBE: NOT DETECTED
HPIV3 RNA NPH QL NAA+NON-PROBE: NOT DETECTED
HPIV4 RNA NPH QL NAA+NON-PROBE: NOT DETECTED
HUMAN METAPNEUMOVIRUS: NOT DETECTED
HYALINE CASTS #/AREA URNS LPF: 3 /LPF
IMM GRANULOCYTES # BLD AUTO: 0.04 K/UL (ref 0–0.04)
IMM GRANULOCYTES # BLD AUTO: 0.04 K/UL (ref 0–0.04)
IMM GRANULOCYTES # BLD AUTO: 0.05 K/UL (ref 0–0.04)
IMM GRANULOCYTES # BLD AUTO: 0.13 K/UL (ref 0–0.04)
IMM GRANULOCYTES NFR BLD AUTO: 0.4 % (ref 0–0.5)
IMM GRANULOCYTES NFR BLD AUTO: 1 % (ref 0–0.5)
INFLUENZA A (SUBTYPES H1,H1-2009,H3): NOT DETECTED
INR PPP: 1.3 (ref 0.8–1.2)
INR PPP: 1.3 (ref 0.8–1.2)
KETONES UR QL STRIP: NEGATIVE
LACTATE SERPL-SCNC: 2.4 MMOL/L (ref 0.5–1.9)
LACTATE SERPL-SCNC: 3.4 MMOL/L (ref 0.5–1.9)
LACTATE SERPL-SCNC: 3.5 MMOL/L (ref 0.5–1.9)
LACTATE SERPL-SCNC: 4.7 MMOL/L (ref 0.5–1.9)
LDH SERPL L TO P-CCNC: 313 U/L (ref 110–260)
LDH SERPL L TO P-CCNC: 328 U/L (ref 110–260)
LEUKOCYTE ESTERASE UR QL STRIP: NEGATIVE
LIPASE SERPL-CCNC: 22 U/L (ref 4–60)
LIPASE SERPL-CCNC: 23 U/L (ref 4–60)
LYMPHOCYTES # BLD AUTO: 1 K/UL (ref 1–4.8)
LYMPHOCYTES # BLD AUTO: 1.4 K/UL (ref 1–4.8)
LYMPHOCYTES # BLD AUTO: 1.5 K/UL (ref 1–4.8)
LYMPHOCYTES # BLD AUTO: 1.7 K/UL (ref 1–4.8)
LYMPHOCYTES NFR BLD: 13.1 % (ref 18–48)
LYMPHOCYTES NFR BLD: 13.7 % (ref 18–48)
LYMPHOCYTES NFR BLD: 14.5 % (ref 18–48)
LYMPHOCYTES NFR BLD: 7 % (ref 18–48)
MAGNESIUM SERPL-MCNC: 1.6 MG/DL (ref 1.6–2.6)
MAGNESIUM SERPL-MCNC: 1.6 MG/DL (ref 1.6–2.6)
MAGNESIUM SERPL-MCNC: 1.8 MG/DL (ref 1.6–2.6)
MAGNESIUM SERPL-MCNC: 2.1 MG/DL (ref 1.6–2.6)
MCH RBC QN AUTO: 32.9 PG (ref 27–31)
MCH RBC QN AUTO: 33.1 PG (ref 27–31)
MCH RBC QN AUTO: 33.1 PG (ref 27–31)
MCH RBC QN AUTO: 33.3 PG (ref 27–31)
MCHC RBC AUTO-ENTMCNC: 36.1 G/DL (ref 32–36)
MCHC RBC AUTO-ENTMCNC: 36.1 G/DL (ref 32–36)
MCHC RBC AUTO-ENTMCNC: 36.4 G/DL (ref 32–36)
MCHC RBC AUTO-ENTMCNC: 36.9 G/DL (ref 32–36)
MCV RBC AUTO: 90 FL (ref 82–98)
MCV RBC AUTO: 91 FL (ref 82–98)
MCV RBC AUTO: 92 FL (ref 82–98)
MCV RBC AUTO: 92 FL (ref 82–98)
MICROSCOPIC COMMENT: ABNORMAL
MIN VOL: 15
MODE: ABNORMAL
MONOCYTES # BLD AUTO: 0.4 K/UL (ref 0.3–1)
MONOCYTES # BLD AUTO: 0.5 K/UL (ref 0.3–1)
MONOCYTES # BLD AUTO: 0.5 K/UL (ref 0.3–1)
MONOCYTES # BLD AUTO: 0.7 K/UL (ref 0.3–1)
MONOCYTES NFR BLD: 3.6 % (ref 4–15)
MONOCYTES NFR BLD: 3.9 % (ref 4–15)
MONOCYTES NFR BLD: 4 % (ref 4–15)
MONOCYTES NFR BLD: 6.1 % (ref 4–15)
MYCOPLASMA PNEUMONIAE: NOT DETECTED
NEUTROPHILS # BLD AUTO: 12 K/UL (ref 1.8–7.7)
NEUTROPHILS # BLD AUTO: 9 K/UL (ref 1.8–7.7)
NEUTROPHILS # BLD AUTO: 9.1 K/UL (ref 1.8–7.7)
NEUTROPHILS # BLD AUTO: 9.2 K/UL (ref 1.8–7.7)
NEUTROPHILS NFR BLD: 78.9 % (ref 38–73)
NEUTROPHILS NFR BLD: 81.8 % (ref 38–73)
NEUTROPHILS NFR BLD: 82.7 % (ref 38–73)
NEUTROPHILS NFR BLD: 88 % (ref 38–73)
NITRITE UR QL STRIP: NEGATIVE
NRBC BLD-RTO: 0 /100 WBC
PCO2 BLDA: 29.4 MMHG (ref 35–45)
PCO2 BLDA: 30.1 MMHG (ref 35–45)
PCO2 BLDA: 31.3 MMHG (ref 35–45)
PCO2 BLDA: 41.5 MMHG (ref 35–45)
PEEP: 5
PH SMN: 7.42 [PH] (ref 7.35–7.45)
PH SMN: 7.45 [PH] (ref 7.35–7.45)
PH SMN: 7.45 [PH] (ref 7.35–7.45)
PH SMN: 7.5 [PH] (ref 7.35–7.45)
PH UR STRIP: >8 [PH] (ref 5–8)
PHOSPHATE SERPL-MCNC: 2.9 MG/DL (ref 2.7–4.5)
PHOSPHATE SERPL-MCNC: 3 MG/DL (ref 2.7–4.5)
PHOSPHATE SERPL-MCNC: 3.2 MG/DL (ref 2.7–4.5)
PHOSPHATE SERPL-MCNC: 3.5 MG/DL (ref 2.7–4.5)
PIP: 26
PIP: 30
PIP: 34
PLATELET # BLD AUTO: 165 K/UL (ref 150–450)
PLATELET # BLD AUTO: 166 K/UL (ref 150–450)
PLATELET # BLD AUTO: 167 K/UL (ref 150–450)
PLATELET # BLD AUTO: 173 K/UL (ref 150–450)
PMV BLD AUTO: 10.8 FL (ref 9.2–12.9)
PMV BLD AUTO: 10.9 FL (ref 9.2–12.9)
PMV BLD AUTO: 11.2 FL (ref 9.2–12.9)
PMV BLD AUTO: 11.7 FL (ref 9.2–12.9)
PO2 BLDA: 110 MMHG (ref 80–100)
PO2 BLDA: 232 MMHG (ref 80–100)
PO2 BLDA: 537 MMHG (ref 80–100)
PO2 BLDA: 608 MMHG (ref 80–100)
POC BE: -2 MMOL/L
POC BE: -4 MMOL/L
POC BE: 0 MMOL/L
POC BE: 3 MMOL/L
POC IONIZED CALCIUM: 1.01 MMOL/L (ref 1.06–1.42)
POC SATURATED O2: 100 % (ref 95–100)
POC SATURATED O2: 99 % (ref 95–100)
POC TCO2: 21 MMOL/L (ref 23–27)
POC TCO2: 23 MMOL/L (ref 23–27)
POC TCO2: 24 MMOL/L (ref 23–27)
POC TCO2: 28 MMOL/L (ref 23–27)
POTASSIUM BLD-SCNC: 3.6 MMOL/L (ref 3.5–5.1)
POTASSIUM SERPL-SCNC: 3.6 MMOL/L (ref 3.5–5.1)
POTASSIUM SERPL-SCNC: 3.9 MMOL/L (ref 3.5–5.1)
POTASSIUM SERPL-SCNC: 4.1 MMOL/L (ref 3.5–5.1)
POTASSIUM SERPL-SCNC: 4.8 MMOL/L (ref 3.5–5.1)
PROT SERPL-MCNC: 5.2 G/DL (ref 6–8.4)
PROT SERPL-MCNC: 5.5 G/DL (ref 6–8.4)
PROT SERPL-MCNC: 5.9 G/DL (ref 6–8.4)
PROT SERPL-MCNC: 5.9 G/DL (ref 6–8.4)
PROT UR QL STRIP: ABNORMAL
PROTHROMBIN TIME: 13.7 SEC (ref 9–12.5)
PROTHROMBIN TIME: 14 SEC (ref 9–12.5)
RBC # BLD AUTO: 3.38 M/UL (ref 4–5.4)
RBC # BLD AUTO: 3.89 M/UL (ref 4–5.4)
RBC # BLD AUTO: 4.2 M/UL (ref 4–5.4)
RBC # BLD AUTO: 4.32 M/UL (ref 4–5.4)
RBC #/AREA URNS HPF: 6 /HPF (ref 0–4)
RESPIRATORY INFECTION PANEL SOURCE: NORMAL
RSV RNA NPH QL NAA+NON-PROBE: NOT DETECTED
RV+EV RNA NPH QL NAA+NON-PROBE: NOT DETECTED
SAMPLE: ABNORMAL
SARS-COV-2 RNA RESP QL NAA+PROBE: NOT DETECTED
SITE: ABNORMAL
SODIUM BLD-SCNC: 141 MMOL/L (ref 136–145)
SODIUM SERPL-SCNC: 136 MMOL/L (ref 136–145)
SODIUM SERPL-SCNC: 136 MMOL/L (ref 136–145)
SODIUM SERPL-SCNC: 137 MMOL/L (ref 136–145)
SODIUM SERPL-SCNC: 137 MMOL/L (ref 136–145)
SP GR UR STRIP: 1.02 (ref 1–1.03)
SP02: 100
SQUAMOUS #/AREA URNS HPF: 2 /HPF
TROPONIN I SERPL HS-MCNC: 2224.6 PG/ML (ref 0–14.9)
TROPONIN I SERPL HS-MCNC: 2253.1 PG/ML (ref 0–14.9)
URN SPEC COLLECT METH UR: ABNORMAL
UROBILINOGEN UR STRIP-ACNC: NEGATIVE EU/DL
VT: 500
WBC # BLD AUTO: 10.97 K/UL (ref 3.9–12.7)
WBC # BLD AUTO: 11.21 K/UL (ref 3.9–12.7)
WBC # BLD AUTO: 11.35 K/UL (ref 3.9–12.7)
WBC # BLD AUTO: 13.64 K/UL (ref 3.9–12.7)
WBC #/AREA URNS HPF: 1 /HPF (ref 0–5)

## 2023-03-19 PROCEDURE — 82553 CREATINE MB FRACTION: CPT | Mod: 91 | Performed by: INTERNAL MEDICINE

## 2023-03-19 PROCEDURE — 99291 PR CRITICAL CARE, E/M 30-74 MINUTES: ICD-10-PCS | Mod: ,,, | Performed by: INTERNAL MEDICINE

## 2023-03-19 PROCEDURE — 63700000 PHARM REV CODE 250 ALT 637 W/O HCPCS: Performed by: INTERNAL MEDICINE

## 2023-03-19 PROCEDURE — P9047 ALBUMIN (HUMAN), 25%, 50ML: HCPCS | Mod: JZ,JG | Performed by: INTERNAL MEDICINE

## 2023-03-19 PROCEDURE — 82803 BLOOD GASES ANY COMBINATION: CPT

## 2023-03-19 PROCEDURE — 84484 ASSAY OF TROPONIN QUANT: CPT | Performed by: INTERNAL MEDICINE

## 2023-03-19 PROCEDURE — 86900 BLOOD TYPING SEROLOGIC ABO: CPT | Performed by: INTERNAL MEDICINE

## 2023-03-19 PROCEDURE — 82248 BILIRUBIN DIRECT: CPT | Performed by: INTERNAL MEDICINE

## 2023-03-19 PROCEDURE — 85025 COMPLETE CBC W/AUTO DIFF WBC: CPT | Mod: 91 | Performed by: INTERNAL MEDICINE

## 2023-03-19 PROCEDURE — 99291 CRITICAL CARE FIRST HOUR: CPT | Mod: ,,, | Performed by: INTERNAL MEDICINE

## 2023-03-19 PROCEDURE — 83735 ASSAY OF MAGNESIUM: CPT | Performed by: INTERNAL MEDICINE

## 2023-03-19 PROCEDURE — 25000003 PHARM REV CODE 250: Performed by: INTERNAL MEDICINE

## 2023-03-19 PROCEDURE — 20000000 HC ICU ROOM

## 2023-03-19 PROCEDURE — 25000242 PHARM REV CODE 250 ALT 637 W/ HCPCS: Performed by: INTERNAL MEDICINE

## 2023-03-19 PROCEDURE — 84100 ASSAY OF PHOSPHORUS: CPT | Mod: 91 | Performed by: INTERNAL MEDICINE

## 2023-03-19 PROCEDURE — 27000221 HC OXYGEN, UP TO 24 HOURS

## 2023-03-19 PROCEDURE — 80053 COMPREHEN METABOLIC PANEL: CPT | Performed by: INTERNAL MEDICINE

## 2023-03-19 PROCEDURE — 87205 SMEAR GRAM STAIN: CPT | Performed by: INTERNAL MEDICINE

## 2023-03-19 PROCEDURE — 63600175 PHARM REV CODE 636 W HCPCS: Performed by: INTERNAL MEDICINE

## 2023-03-19 PROCEDURE — 82330 ASSAY OF CALCIUM: CPT

## 2023-03-19 PROCEDURE — 87633 RESP VIRUS 12-25 TARGETS: CPT | Performed by: INTERNAL MEDICINE

## 2023-03-19 PROCEDURE — 87798 DETECT AGENT NOS DNA AMP: CPT | Mod: 59 | Performed by: INTERNAL MEDICINE

## 2023-03-19 PROCEDURE — 85610 PROTHROMBIN TIME: CPT | Mod: 91 | Performed by: INTERNAL MEDICINE

## 2023-03-19 PROCEDURE — 63600175 PHARM REV CODE 636 W HCPCS: Mod: TB,JG | Performed by: INTERNAL MEDICINE

## 2023-03-19 PROCEDURE — 25000003 PHARM REV CODE 250: Performed by: EMERGENCY MEDICINE

## 2023-03-19 PROCEDURE — 94640 AIRWAY INHALATION TREATMENT: CPT

## 2023-03-19 PROCEDURE — 25000003 PHARM REV CODE 250: Mod: TB,JG | Performed by: INTERNAL MEDICINE

## 2023-03-19 PROCEDURE — 87040 BLOOD CULTURE FOR BACTERIA: CPT | Performed by: INTERNAL MEDICINE

## 2023-03-19 PROCEDURE — 84132 ASSAY OF SERUM POTASSIUM: CPT

## 2023-03-19 PROCEDURE — 99900031 HC PATIENT EDUCATION (STAT)

## 2023-03-19 PROCEDURE — 83036 HEMOGLOBIN GLYCOSYLATED A1C: CPT | Performed by: INTERNAL MEDICINE

## 2023-03-19 PROCEDURE — 83615 LACTATE (LD) (LDH) ENZYME: CPT | Performed by: INTERNAL MEDICINE

## 2023-03-19 PROCEDURE — 63600175 PHARM REV CODE 636 W HCPCS: Performed by: EMERGENCY MEDICINE

## 2023-03-19 PROCEDURE — 63600175 PHARM REV CODE 636 W HCPCS: Mod: JZ,JG | Performed by: INTERNAL MEDICINE

## 2023-03-19 PROCEDURE — 84100 ASSAY OF PHOSPHORUS: CPT | Performed by: INTERNAL MEDICINE

## 2023-03-19 PROCEDURE — 80053 COMPREHEN METABOLIC PANEL: CPT | Mod: 91 | Performed by: INTERNAL MEDICINE

## 2023-03-19 PROCEDURE — 87070 CULTURE OTHR SPECIMN AEROBIC: CPT | Performed by: INTERNAL MEDICINE

## 2023-03-19 PROCEDURE — 83605 ASSAY OF LACTIC ACID: CPT | Mod: 91 | Performed by: INTERNAL MEDICINE

## 2023-03-19 PROCEDURE — 94003 VENT MGMT INPAT SUBQ DAY: CPT

## 2023-03-19 PROCEDURE — 84295 ASSAY OF SERUM SODIUM: CPT

## 2023-03-19 PROCEDURE — 94761 N-INVAS EAR/PLS OXIMETRY MLT: CPT

## 2023-03-19 PROCEDURE — 83605 ASSAY OF LACTIC ACID: CPT | Performed by: INTERNAL MEDICINE

## 2023-03-19 PROCEDURE — 99900026 HC AIRWAY MAINTENANCE (STAT)

## 2023-03-19 PROCEDURE — 83690 ASSAY OF LIPASE: CPT | Mod: 91 | Performed by: INTERNAL MEDICINE

## 2023-03-19 PROCEDURE — 82977 ASSAY OF GGT: CPT | Mod: 91 | Performed by: INTERNAL MEDICINE

## 2023-03-19 PROCEDURE — 85730 THROMBOPLASTIN TIME PARTIAL: CPT | Performed by: INTERNAL MEDICINE

## 2023-03-19 PROCEDURE — 87086 URINE CULTURE/COLONY COUNT: CPT | Performed by: INTERNAL MEDICINE

## 2023-03-19 PROCEDURE — C9113 INJ PANTOPRAZOLE SODIUM, VIA: HCPCS | Performed by: INTERNAL MEDICINE

## 2023-03-19 PROCEDURE — 85014 HEMATOCRIT: CPT

## 2023-03-19 PROCEDURE — 25000003 PHARM REV CODE 250

## 2023-03-19 PROCEDURE — 37799 UNLISTED PX VASCULAR SURGERY: CPT

## 2023-03-19 PROCEDURE — 81001 URINALYSIS AUTO W/SCOPE: CPT | Performed by: INTERNAL MEDICINE

## 2023-03-19 PROCEDURE — 83735 ASSAY OF MAGNESIUM: CPT | Mod: 91 | Performed by: INTERNAL MEDICINE

## 2023-03-19 PROCEDURE — 87798 DETECT AGENT NOS DNA AMP: CPT | Performed by: INTERNAL MEDICINE

## 2023-03-19 PROCEDURE — 99900035 HC TECH TIME PER 15 MIN (STAT)

## 2023-03-19 PROCEDURE — 82150 ASSAY OF AMYLASE: CPT | Mod: 91 | Performed by: INTERNAL MEDICINE

## 2023-03-19 RX ORDER — LEVALBUTEROL INHALATION SOLUTION 1.25 MG/3ML
1.25 SOLUTION RESPIRATORY (INHALATION) EVERY 8 HOURS
Status: DISCONTINUED | OUTPATIENT
Start: 2023-03-19 | End: 2023-03-20 | Stop reason: HOSPADM

## 2023-03-19 RX ORDER — FLUCONAZOLE 100 MG/1
100 TABLET ORAL DAILY
Status: DISCONTINUED | OUTPATIENT
Start: 2023-03-19 | End: 2023-03-20 | Stop reason: HOSPADM

## 2023-03-19 RX ORDER — POTASSIUM CHLORIDE, DEXTROSE MONOHYDRATE 150; 5 MG/100ML; G/100ML
INJECTION, SOLUTION INTRAVENOUS CONTINUOUS
Status: DISCONTINUED | OUTPATIENT
Start: 2023-03-19 | End: 2023-03-19

## 2023-03-19 RX ORDER — SODIUM CHLORIDE 450 MG/100ML
INJECTION, SOLUTION INTRAVENOUS CONTINUOUS
Status: DISCONTINUED | OUTPATIENT
Start: 2023-03-19 | End: 2023-03-20 | Stop reason: HOSPADM

## 2023-03-19 RX ORDER — CALCIUM GLUCONATE 20 MG/ML
1 INJECTION, SOLUTION INTRAVENOUS
Status: DISCONTINUED | OUTPATIENT
Start: 2023-03-19 | End: 2023-03-19

## 2023-03-19 RX ORDER — ALBUMIN HUMAN 250 G/1000ML
25 SOLUTION INTRAVENOUS ONCE
Status: COMPLETED | OUTPATIENT
Start: 2023-03-19 | End: 2023-03-19

## 2023-03-19 RX ORDER — LEVETIRACETAM 500 MG/5ML
1500 INJECTION, SOLUTION, CONCENTRATE INTRAVENOUS ONCE
Status: COMPLETED | OUTPATIENT
Start: 2023-03-19 | End: 2023-03-19

## 2023-03-19 RX ORDER — IPRATROPIUM BROMIDE 0.5 MG/2.5ML
0.5 SOLUTION RESPIRATORY (INHALATION) EVERY 4 HOURS
Status: DISCONTINUED | OUTPATIENT
Start: 2023-03-19 | End: 2023-03-20 | Stop reason: HOSPADM

## 2023-03-19 RX ORDER — LEVETIRACETAM 5 MG/ML
500 INJECTION INTRAVASCULAR EVERY 12 HOURS
Status: DISCONTINUED | OUTPATIENT
Start: 2023-03-19 | End: 2023-03-20 | Stop reason: HOSPADM

## 2023-03-19 RX ADMIN — LEVETIRACETAM 500 MG: 5 INJECTION INTRAVENOUS at 06:03

## 2023-03-19 RX ADMIN — INSULIN ASPART 1 UNITS: 100 INJECTION, SOLUTION INTRAVENOUS; SUBCUTANEOUS at 12:03

## 2023-03-19 RX ADMIN — NOREPINEPHRINE BITARTRATE 0.06 MCG/KG/MIN: 4 INJECTION, SOLUTION INTRAVENOUS at 03:03

## 2023-03-19 RX ADMIN — FLUCONAZOLE 100 MG: 100 TABLET ORAL at 01:03

## 2023-03-19 RX ADMIN — LEVALBUTEROL HYDROCHLORIDE 1.25 MG: 1.25 SOLUTION RESPIRATORY (INHALATION) at 01:03

## 2023-03-19 RX ADMIN — MAGNESIUM SULFATE HEPTAHYDRATE 2 G: 40 INJECTION, SOLUTION INTRAVENOUS at 07:03

## 2023-03-19 RX ADMIN — LEVALBUTEROL HYDROCHLORIDE 1.25 MG: 1.25 SOLUTION RESPIRATORY (INHALATION) at 11:03

## 2023-03-19 RX ADMIN — VANCOMYCIN HYDROCHLORIDE 1250 MG: 1.25 INJECTION, POWDER, LYOPHILIZED, FOR SOLUTION INTRAVENOUS at 07:03

## 2023-03-19 RX ADMIN — VASOPRESSIN 0.03 UNITS/HR: 20 INJECTION INTRAVENOUS at 07:03

## 2023-03-19 RX ADMIN — SODIUM CHLORIDE: 0.45 INJECTION, SOLUTION INTRAVENOUS at 06:03

## 2023-03-19 RX ADMIN — PANTOPRAZOLE SODIUM 40 MG: 40 INJECTION, POWDER, FOR SOLUTION INTRAVENOUS at 08:03

## 2023-03-19 RX ADMIN — IPRATROPIUM BROMIDE 0.5 MG: 0.5 SOLUTION RESPIRATORY (INHALATION) at 03:03

## 2023-03-19 RX ADMIN — CEFEPIME HYDROCHLORIDE 2 G: 2 INJECTION, SOLUTION INTRAVENOUS at 10:03

## 2023-03-19 RX ADMIN — SODIUM CHLORIDE, SODIUM LACTATE, POTASSIUM CHLORIDE, AND CALCIUM CHLORIDE 500 ML: .6; .31; .03; .02 INJECTION, SOLUTION INTRAVENOUS at 05:03

## 2023-03-19 RX ADMIN — IPRATROPIUM BROMIDE 0.5 MG: 0.5 SOLUTION RESPIRATORY (INHALATION) at 08:03

## 2023-03-19 RX ADMIN — CALCIUM GLUCONATE 2 G: 20 INJECTION, SOLUTION INTRAVENOUS at 06:03

## 2023-03-19 RX ADMIN — MUPIROCIN 1 G: 20 OINTMENT TOPICAL at 08:03

## 2023-03-19 RX ADMIN — HYDROCORTISONE SODIUM SUCCINATE 100 MG: 100 INJECTION, POWDER, FOR SOLUTION INTRAMUSCULAR; INTRAVENOUS at 01:03

## 2023-03-19 RX ADMIN — NOREPINEPHRINE BITARTRATE 0.23 MCG/KG/MIN: 4 INJECTION, SOLUTION INTRAVENOUS at 07:03

## 2023-03-19 RX ADMIN — ACETYLCYSTEINE 600 MG: 200 SOLUTION ORAL; RESPIRATORY (INHALATION) at 08:03

## 2023-03-19 RX ADMIN — LEVETIRACETAM 1500 MG: 100 INJECTION, SOLUTION INTRAVENOUS at 10:03

## 2023-03-19 RX ADMIN — POTASSIUM CHLORIDE: 2 INJECTION, SOLUTION, CONCENTRATE INTRAVENOUS at 07:03

## 2023-03-19 RX ADMIN — POTASSIUM CHLORIDE 40 MEQ: 29.8 INJECTION, SOLUTION INTRAVENOUS at 01:03

## 2023-03-19 RX ADMIN — MUPIROCIN 1 G: 20 OINTMENT TOPICAL at 07:03

## 2023-03-19 RX ADMIN — IPRATROPIUM BROMIDE 0.5 MG: 0.5 SOLUTION RESPIRATORY (INHALATION) at 11:03

## 2023-03-19 RX ADMIN — SODIUM BICARBONATE: 84 INJECTION, SOLUTION INTRAVENOUS at 07:03

## 2023-03-19 RX ADMIN — SODIUM CHLORIDE 2000 ML: 0.9 INJECTION, SOLUTION INTRAVENOUS at 01:03

## 2023-03-19 RX ADMIN — SODIUM CHLORIDE 500 ML: 0.9 INJECTION, SOLUTION INTRAVENOUS at 06:03

## 2023-03-19 RX ADMIN — CALCIUM GLUCONATE 2 G: 20 INJECTION, SOLUTION INTRAVENOUS at 01:03

## 2023-03-19 RX ADMIN — CHLORHEXIDINE GLUCONATE 15 ML: 1.2 RINSE ORAL at 07:03

## 2023-03-19 RX ADMIN — PROPOFOL 20 MCG/KG/MIN: 10 INJECTION, EMULSION INTRAVENOUS at 08:03

## 2023-03-19 RX ADMIN — ALBUMIN (HUMAN) 25 G: 12.5 SOLUTION INTRAVENOUS at 01:03

## 2023-03-19 RX ADMIN — THIAMINE HYDROCHLORIDE 100 MG: 100 INJECTION, SOLUTION INTRAMUSCULAR; INTRAVENOUS at 08:03

## 2023-03-19 RX ADMIN — CHLORHEXIDINE GLUCONATE 15 ML: 1.2 RINSE ORAL at 08:03

## 2023-03-19 RX ADMIN — CEFEPIME HYDROCHLORIDE 2 G: 2 INJECTION, SOLUTION INTRAVENOUS at 03:03

## 2023-03-19 RX ADMIN — CEFEPIME HYDROCHLORIDE 2 G: 2 INJECTION, SOLUTION INTRAVENOUS at 07:03

## 2023-03-19 RX ADMIN — CALCIUM GLUCONATE 1 G: 20 INJECTION, SOLUTION INTRAVENOUS at 07:03

## 2023-03-19 RX ADMIN — HYDROCORTISONE SODIUM SUCCINATE 100 MG: 100 INJECTION, POWDER, FOR SOLUTION INTRAMUSCULAR; INTRAVENOUS at 10:03

## 2023-03-19 RX ADMIN — MAGNESIUM SULFATE HEPTAHYDRATE 2 G: 40 INJECTION, SOLUTION INTRAVENOUS at 01:03

## 2023-03-19 RX ADMIN — SODIUM CHLORIDE: 0.45 INJECTION, SOLUTION INTRAVENOUS at 02:03

## 2023-03-19 NOTE — PROGRESS NOTES
Pulmonary/Critical Care  Progress Note      Patient name: Mishel Ruelas  MRN: [id number]  Date: 03/19/2023    Admit Date: 3/17/2023  Consult Requested By: Campbell Interiano MD    Reason for Consult: s/p arrest, respiratory failure    HPI:    3/18/2023 - 26 yo h/o opioid abuse found in bathroom slumped over the sink with a needle in her leg, CPR was started and EMS called, initial rhythm was asystole.  She got narcan and 1 mg epi and reportedly had ROSC soon after.  By reported she vomited and may have aspirated while being bagged.  Brought to ER and intubated.  Had hyperkalemia (which was addressed), acidosis (being treated).  Overnight she had some mottling of her left hand and that is being evaluated (has occluded left peroneal artery)  This AM she is being cooled (just reached goal), she is sedated, not paralyzed.  No pupil response, no corneals, no response to pain, no shivering, has taken occasional spontaneous respiratory effort.  WBC has increased.  K low, + acidosis (AG+), + LA, PHO4 low, ECHO shows EF 25%    3/19/2023 - Starting to rewarm, she remains unresponsive, no pupils, corneals or Doll's eyes, + spontaneous respiratory efforts, some myoclonic (vs seizure activity) noted in LE.  H/H has decreased but no active bleeding reported. Electrolytes being replaced, LA is better.  CXR reviewed.    Review of Systems    Review of Systems   Unable to perform ROS: Intubated     Past Medical History    Past Medical History:   Diagnosis Date    Seizures        Past Surgical History    Past Surgical History:   Procedure Laterality Date    APPENDECTOMY         Medications (scheduled):      ceFEPime (MAXIPIME) IVPB  2 g Intravenous Q8H    chlorhexidine  15 mL Mouth/Throat BID    enoxparin  40 mg Subcutaneous Daily    mupirocin   Nasal BID    pantoprazole  40 mg Intravenous Daily    potassium chloride  60 mEq Oral Once    thiamine (VITAMIN B1) IVPB  100 mg Intravenous Daily    vancomycin (VANCOCIN) IVPB   "1,250 mg Intravenous Q24H       Medications (infusions):      fentanyl 50 mcg/hr (03/19/23 0600)    NORepinephrine bitartrate-D5W Stopped (03/17/23 2200)    propofoL 20 mcg/kg/min (03/19/23 0847)    sodium bicarbonate drip 125 mL/hr at 03/19/23 0755       Medications (prn):     calcium gluconate IVPB, calcium gluconate IVPB, calcium gluconate IVPB, dextrose 10%, dextrose 10%, glucagon (human recombinant), hydrALAZINE, insulin aspart U-100, magnesium sulfate IVPB, magnesium sulfate IVPB, potassium chloride in water **AND** potassium chloride in water **AND** potassium chloride in water, sodium chloride 0.9%, sodium phosphate IVPB, sodium phosphate IVPB, sodium phosphate IVPB, Pharmacy to dose Vancomycin consult **AND** vancomycin - pharmacy to dose    Family History:   Family History   Problem Relation Age of Onset    Thyroid disease Mother     Hypertension Father        Social History: Tobacco:   Social History     Tobacco Use   Smoking Status Some Days    Packs/day: 0.00    Types: Cigarettes   Smokeless Tobacco Never   Tobacco Comments    patient reports she only smokes 2-3 cigarettes a week                                 EtOH:   Social History     Substance and Sexual Activity   Alcohol Use Yes    Alcohol/week: 1.0 standard drink    Types: 1 Glasses of wine per week                                Drugs:   Social History     Substance and Sexual Activity   Drug Use Not on file    Comment: SEE PREVIOUS ER VISITS       Physical Exam    Vital signs:  Temp:  [90.7 °F (32.6 °C)-91.8 °F (33.2 °C)]   Pulse:  []   Resp:  [13-36]   BP: ()/()   SpO2:  [100 %]   Arterial Line BP: ()/()     Intake/Output:   Intake/Output Summary (Last 24 hours) at 3/19/2023 0904  Last data filed at 3/19/2023 0600  Gross per 24 hour   Intake 8807.76 ml   Output 2485 ml   Net 6322.76 ml          BMI: Estimated body mass index is 23.85 kg/m² as calculated from the following:    Height as of this encounter: 5' 5" " (1.651 m).    Weight as of this encounter: 65 kg (143 lb 4.8 oz).    Physical Exam  Vitals and nursing note reviewed.   Constitutional:       General: She is not in acute distress.     Appearance: She is ill-appearing. She is not toxic-appearing or diaphoretic.      Comments: Young  Unresponsive, sedated  Ventilated   HENT:      Head: Normocephalic and atraumatic.      Right Ear: External ear normal.      Left Ear: External ear normal.      Nose: Nose normal. No congestion or rhinorrhea.      Mouth/Throat:      Mouth: Mucous membranes are moist.      Pharynx: Oropharynx is clear. No oropharyngeal exudate or posterior oropharyngeal erythema.      Comments: ETT  Eyes:      General: No scleral icterus.        Right eye: No discharge.         Left eye: No discharge.      Conjunctiva/sclera: Conjunctivae normal.      Comments: No pupils, no corneals, no Doll's eyes   Neck:      Vascular: No carotid bruit.   Cardiovascular:      Rate and Rhythm: Normal rate and regular rhythm.      Pulses: Normal pulses.      Heart sounds: No murmur heard.    No friction rub. No gallop.   Pulmonary:      Effort: Pulmonary effort is normal. No respiratory distress.      Breath sounds: No stridor. No wheezing, rhonchi or rales.      Comments: Ventilated   Chest:      Chest wall: No tenderness.   Abdominal:      General: There is no distension.      Tenderness: There is no abdominal tenderness. There is no guarding.      Comments: No BS heard   Genitourinary:     Comments: [place]   Musculoskeletal:         General: No swelling. Normal range of motion.      Cervical back: Normal range of motion and neck supple. No rigidity or tenderness.      Right lower leg: No edema.      Left lower leg: No edema.      Comments: Left hand dusky   Lymphadenopathy:      Cervical: No cervical adenopathy.   Skin:     General: Skin is warm and dry.      Coloration: Skin is not jaundiced.      Findings: No bruising.      Comments: Multiple tattoos   Neurological:       Comments: No response  Some myoclonic movement in her LE  Continuous EEG on   Psychiatric:      Comments: Not able to assess       Laboratory    Recent Labs   Lab 03/19/23  0512   WBC 11.35   RBC 3.89*   HGB 12.8   HCT 35.2*      MCV 91   MCH 32.9*   MCHC 36.4*         Recent Labs   Lab 03/19/23  0512   CALCIUM 6.9*   PROT 5.2*      K 3.9   CO2 21*      BUN 19   CREATININE 0.5   ALKPHOS 48*   ALT 65*   *   BILITOT 0.4         No results for input(s): PT, INR, APTT in the last 24 hours.    No results for input(s): CPK, CPKMB, TROPONINI, MB in the last 24 hours.      Additional labs: reviewed    Microbiology:       Microbiology Results (last 7 days)       Procedure Component Value Units Date/Time    MRSA Screen by PCR [[id number]] Collected: 03/18/23 1137    Order Status: Completed Specimen: Nasopharyngeal Swab from Nasal Updated: 03/18/23 1308     MRSA SCREEN BY PCR Negative    Blood culture [[id number]]     Order Status: Sent Specimen: Blood             Radiology    X-Ray Chest AP Portable  HISTORY: resp fail    COMPARISON:3/18/2023    FINDINGS:Heart remains normal in size. Tip of the endotracheal tube projects roughly 2.3 cm above the tracheal evangelist. NG tube passes into the stomach. Esophageal monitoring probe lies in the distal esophagus. Right-sided IJ catheter tip projecting over the entry point of the superior vena cava. Mild asymmetric pulmonary edema more prominent towards the right. No evidence of pneumothorax. The upper abdominal cavity appears normal. The regional skeleton appears unremarkable.    IMPRESSION:  1. Stable appearance of the chest when compared to previous.  2. Stable position of all support lines and tubes.    Electronically signed by:  Roman Christianson MD  3/19/2023 6:47 AM CDT Workstation: [id number]        Additional Studies    reviewed    Ventilator Information    Vent Mode: A/C  Oxygen Concentration (%):  [25-35] 25  Resp Rate Total:  [26 br/min-36 br/min] 33  br/min  Vt Set:  [500 mL] 500 mL  PEEP/CPAP:  [5 cmH20] 5 cmH20  Mean Airway Pressure:  [11 cmH20-15 cmH20] 11 cmH20         Recent Labs     03/19/23  0423   PH 7.450   PCO2 29.4*   PO2 232*   HCO3 20.5*   POCSATURATED 100   BE -4           Impression    Active Hospital Problems    Diagnosis  POA    *Cardiac arrest [I46.9]  Unknown    Acute hypoxemic respiratory failure [J96.01]  Yes    Lactic acidosis [E87.20]  Yes    Hypophosphatemia [E83.39]  Yes    Hypokalemia [E87.6]  Yes    Leukocytosis [D72.829]  Yes    Transaminitis [R74.01]  No    Acute systolic congestive heart failure [I50.21]  Yes      Resolved Hospital Problems   No resolved problems to display.       Plan    Ventilator and adjust as needed  DC bicarb drip and recheck ABG  Continue antibiotics, MRSA screen negative  WBC may be elevated due to stress  Trend lactic acid  CHF noted  Trend LFT  Continue with warming  Neuro following  Aware of drug abuse  Follow electrolytes and replace via SS  Try to minimize sedation so we can assess neuro status  Overall prognosis is poor    Thank you for this consult.  I will follow with you while the patient is hospitalized.      Critical Care Time    I have spent > 35 minutes providing critical care services for this pt for the above diagnoses.  These services have included pt evaluation, pt exam, ventilator assessment, discussions with staff, chart review, data review, note preparation and .  Medications have been reviewed and adjusted as needed.  The patient has life threatening illness with a high risk of decompensation and/or death.      Dominic Patrick MD  [facility] Pulmonary/Critical Care

## 2023-03-19 NOTE — PROGRESS NOTES
[facility]  Department of Neurology  Progress Note  Date: 2023 12:06 PM          Patient Name: Mishel Ruelas   MRN: [id number]   : 1995    AGE: 27 y.o.    LOS: 2 days Hospital Day: 3  Admit date: 3/17/2023  9:29 PM     HPI per EMR: Mishel Ruelas is a 27 y.o. female with a history of IV opioid abuse, seizures, recently released from group home and was known to have been taking Xanax but otherwise clean.  She lives with her boyfriend and was noticed to have been in the bathroom for on unusually long period of time.  The boyfriend thought that she had had a seizure in the bathroom and had used a pin to open up the door to the bathroom.  When the boyfriend found the patient she was slumped over a sink and patient had a needle for drug use in her leg.  The boyfriend started CPR, when EMS arrived she was in asystole and 2 mg of Narcan given in 1 mg of epi.  She vomited when being bagged.  She had ROSC at the next pulse check.     In the ER patient was intubated, found to be hyperkalemic and EKG with peaked T-waves and treated with dextrose/insulin, calcium gluconate, bicarbonate for severe acidosis.     Neurology consult: Patient was seen and examined by me.  Patient remains to be intubated, sedated with fentanyl and propofol.  Ongoing hypothermia protocol.  Per patient's nurse, she does not respond to any stimulus.     2023:  Patient was seen examined by me.  Remains to be intubated and on sedation.  On exam, pupils were abnormal and unequal, brainstem reflexes were not present.  Patient was being rewarmed.  She is on continuous EEG which shows background slowing with very low voltage and intermittent bursts of high-voltage slowing with occasional spikes indicating epileptiform activity.  No clinical or electrographic seizures were recorded.  Given his EEG findings, patient was started on Keppra.       Vitals:  Patient Vitals for the past 24 hrs:   BP Temp Temp src Pulse  Resp SpO2   03/19/23 1205 -- -- -- (!) 111 (!) 9 100 %   03/19/23 1120 -- -- -- (!) 112 (!) 29 100 %   03/19/23 1045 -- -- -- (!) 115 (!) 26 100 %   03/19/23 1030 -- -- -- 109 (!) 24 100 %   03/19/23 1015 -- -- -- 106 (!) 24 100 %   03/19/23 1000 139/86 -- -- 100 (!) 24 100 %   03/19/23 0945 -- -- -- 98 (!) 27 100 %   03/19/23 0930 -- -- -- 101 (!) 31 100 %   03/19/23 0915 -- -- -- 99 (!) 31 100 %   03/19/23 0900 92/65 (!) 92.3 °F (33.5 °C) Esophageal 97 (!) 25 100 %   03/19/23 0847 -- -- -- (!) 111 (!) 28 100 %   03/19/23 0829 -- -- -- (!) 112 (!) 27 100 %   03/19/23 0800 (!) 127/101 -- -- 108 (!) 30 100 %   03/19/23 0745 -- -- -- 108 (!) 31 100 %   03/19/23 0730 -- -- -- 98 (!) 34 100 %   03/19/23 0715 -- -- -- 96 (!) 35 100 %   03/19/23 0707 -- -- -- 96 (!) 34 100 %   03/19/23 0700 134/80 -- -- 93 (!) 36 100 %   03/19/23 0645 -- -- -- 96 (!) 36 100 %   03/19/23 0600 121/83 (!) 91.6 °F (33.1 °C) Esophageal 93 (!) 29 100 %   03/19/23 0500 -- (!) 91.4 °F (33 °C) Esophageal 91 (!) 27 100 %   03/19/23 0429 -- -- -- 90 (!) 25 100 %   03/19/23 0400 -- (!) 91.4 °F (33 °C) Esophageal 84 (!) 30 100 %   03/19/23 0313 -- -- -- 82 (!) 31 100 %   03/19/23 0300 (!) 126/99 (!) 91.4 °F (33 °C) Esophageal 82 (!) 29 100 %   03/19/23 0200 (!) 119/92 (!) 91.6 °F (33.1 °C) Esophageal 86 (!) 31 100 %   03/19/23 0100 -- (!) 91.8 °F (33.2 °C) Esophageal 85 (!) 30 100 %   03/19/23 0051 -- -- -- 85 (!) 31 100 %   03/19/23 0000 (!) 119/90 (!) 91.4 °F (33 °C) Esophageal 85 (!) 30 100 %   03/18/23 2300 (!) 124/95 (!) 91.4 °F (33 °C) Esophageal 81 (!) 30 100 %   03/18/23 2229 -- -- -- 79 (!) 30 100 %   03/18/23 2200 122/77 (!) 90.9 °F (32.7 °C) Esophageal 78 (!) 30 100 %   03/18/23 2100 (!) 122/96 -- -- 77 (!) 30 100 %   03/18/23 2045 -- -- -- 77 (!) 30 100 %   03/18/23 2000 (!) 123/92 (!) 90.7 °F (32.6 °C) Esophageal 78 (!) 30 100 %   03/18/23 1945 -- -- -- 79 (!) 21 100 %   03/18/23 1930 -- -- -- 76 (!) 30 100 %   03/18/23 1915 -- (!) 91.4  °F (33 °C) Esophageal 76 (!) 30 100 %   03/18/23 1900 -- -- -- 75 13 100 %   03/18/23 1845 -- -- -- 75 -- 100 %   03/18/23 1830 -- -- -- 74 -- 100 %   03/18/23 1815 -- -- -- 73 -- 100 %   03/18/23 1800 (!) 120/99 -- -- 72 -- 100 %   03/18/23 1745 -- -- -- 73 -- 100 %   03/18/23 1730 -- -- -- 74 -- 100 %   03/18/23 1715 -- -- -- 74 -- 100 %   03/18/23 1700 -- -- -- 72 -- 100 %   03/18/23 1645 -- -- -- 73 -- 100 %   03/18/23 1630 -- -- -- 73 -- 100 %   03/18/23 1627 -- -- -- 72 -- 100 %   03/18/23 1615 -- -- -- 71 -- 100 %   03/18/23 1600 (!) 120/95 -- -- 71 -- 100 %   03/18/23 1545 -- -- -- 70 -- 100 %   03/18/23 1530 -- -- -- 71 -- 100 %   03/18/23 1500 (!) 120/92 (!) 91.4 °F (33 °C) Esophageal 72 -- 100 %   03/18/23 1415 -- -- -- 73 -- 100 %   03/18/23 1400 (!) 120/95 -- -- 73 -- 100 %   03/18/23 1345 -- -- -- 83 -- 100 %   03/18/23 1330 -- -- -- 68 -- 100 %   03/18/23 1315 -- -- -- 71 -- 100 %   03/18/23 1300 (!) 129/99 -- -- 70 -- 100 %   03/18/23 1245 -- -- -- 67 -- 100 %   03/18/23 1230 -- -- -- 66 -- 100 %   03/18/23 1215 -- -- -- 66 -- 100 %     PHYSICAL EXAM:   General appearance:  Intubated, sedated.  Cardiovascular: S1+ S2+  Pulmonary:  Intubated on AC  GI:  Soft        NEUROLOGICAL EXAM:     Mental status:  Intubated, sedated.  Does not open eyes to any stimulus.  Does not follow commands          Intubated: yes          Sedated: yes  Response to noxious stimulation: no  Breathes above ventilator: no  Opens eyes: no  Pupils:  Asymmetric and nonreactive  Eye movements: No pathologic movements such as nystagmus, ocular bobbing, dipping or roving.  Corneal reflex: absent  Oculocephalic reflex: absent  Cough: absent  Gag: absent  Motor exam: Normal muscle tone. Normal muscle bulk. No abnormal movements such as tremors, myoclonus or twitching.  DTRs: diminished throughout. Babinski response absent.       CURRENT SCHEDULED MEDICATIONS:   ceFEPime (MAXIPIME) IVPB  2 g Intravenous Q8H    chlorhexidine  15 mL  Mouth/Throat BID    enoxparin  40 mg Subcutaneous Daily    levetiracetam IV  500 mg Intravenous Q12H    mupirocin   Nasal BID    pantoprazole  40 mg Intravenous Daily    potassium chloride  60 mEq Oral Once    thiamine (VITAMIN B1) IVPB  100 mg Intravenous Daily     CURRENT INFUSIONS:   fentanyl Stopped (03/19/23 1008)    NORepinephrine bitartrate-D5W Stopped (03/17/23 2200)    propofoL Stopped (03/19/23 1008)     DATA:  Recent Labs   Lab 03/17/23 2137 03/18/23  0616 03/18/23  1015 03/18/23  1301 03/18/23  1719 03/19/23  0014 03/19/23  0512    138 134* 137 139 136 137   K 7.3* 2.3* 2.9* 3.2* 3.5 3.6 3.9    111* 109 110 108 104 108   CO2 19* 11* 11* 13* 17* 19* 21*   BUN 19 31* 29* 27* 27* 23* 19   CREATININE 1.2 1.2 1.2 1.0 0.9 0.7 0.5   * 355* 404* 309* 309* 218* 133*   CALCIUM 8.8 8.9 8.2* 8.3* 8.0* 6.8* 6.9*   PHOS  --  <1.0*  --  3.2 1.5* 3.5 3.0   MG 2.5 2.7*  --  2.1 2.0 1.6 2.1   * 149* 108* 106* 102* 108* 117*   ALT 75* 125* 111* 116* 105* 81* 65*     Recent Labs   Lab 03/17/23 2137 03/17/23  2150 03/18/23  0616 03/18/23  1201 03/18/23  1301 03/18/23  1719 03/19/23  0014 03/19/23  0423 03/19/23  0512   WBC 10.89  --  23.38*  --  25.28* 17.01* 11.21  --  11.35   HGB 14.2  --  16.9*  --  16.8* 16.5* 14.4  --  12.8   HCT 43.1   < > 48.4 49 46.7 44.9 39.0 36 35.2*     --  247  --  235 220 173  --  166    < > = values in this interval not displayed.     No results found for: PROTEINCSF, GLUCCSF, WBCCSF, RBCCSF, PMNCSF  No results found for: HGBA1C         I have personally reviewed and interpreted the pertinent imaging and lab results.  Imaging Results              US Upper Extremity Veins Left (Final result)  Result time 03/18/23 01:18:13      Final result by Olu Robert DO (03/18/23 01:18:13)                   Narrative:    PROCEDURE:  US Duplex Left Upper Extremity Veins    CLINICAL INDICATION:  The patient is 27 years old and is Female;  swelling    TECHNIQUE:  Real-time duplex ultrasound scan of the left upper extremity veins integrating B-mode two-dimensional vascular structure, Doppler spectral analysis, color flow Doppler imaging and compression.    COMPARISON:  None.    FINDINGS:  DEEP VEINS:  Unremarkable.  No DVT in the internal jugular, subclavian, axillary, or brachial veins.  The veins demonstrate normal color flow, are normally compressible, with normal phasic flow and/or augmentation response.  SUPERFICIAL VEINS:  Unremarkable.  No thrombus in the visualized basilic and cephalic veins.  SOFT TISSUES:  No acute findings.    IMPRESSION:    No left upper extremity DVT.    Electronically signed by:  Olu Robert DO  3/18/2023 1:18 AM CDT Workstation: [id number]                                     CT Abdomen Pelvis With Contrast (Final result)  Result time 03/18/23 00:50:48      Final result by Randall Diaz MD (03/18/23 00:50:48)                   Narrative:    EXAM DESCRIPTION: CT ABDOMEN PELVIS WITH CONTRAST 3/18/2023 12:48 AM CDT    CLINICAL HISTORY: 27 years, Female, Sepsis    COMPARISON: None.    PROCEDURE:    Contrast-enhanced images of the abdomen and pelvis were performed utilizing 2 mm slice thickness at 2 mm interval reconstruction from the lung bases to the ischial tuberosities after the administration of IV contrast.  In addition multiplanar reformats in the coronal and sagittal plane were obtained and reviewed.  This exam was performed according to our departmental dose-optimization protocol, which includes automated exposure control, adjustment of the mA and/or kV according to patient size and/or use of iterative reconstruction technique.    FINDINGS:    Evaluation of the chest in separate report.    There is a nasal gastric tube within the antrum of the stomach in good position.  There are dilated proximal small bowel loops extending down to the ileocecal valve and fecal residue and air involving the large bowel. No  evidence for transition point could be identified and findings could correspond to perhaps ileus.    The liver, gallbladder, pancreas, spleen and adrenal glands demonstrate to be unremarkable, no focal lesions are noted.  The kidneys demonstrate normal uptake of contrast media. No evidence for nephrolithiasis and/or hydronephrosis.  The urinary bladder demonstrate the presence of a Acosta catheter.  The uterus is unremarkable. There are no adnexal masses.  The aorta demonstrate to be normal.  There is no retroperitoneal lymphadenopathy. There is no evidence for ascites/or significant abnormal fluid collection. The rest of the soft tissue and bony structures are within normal limits.    IMPRESSION:  Dilated proximal mid and distal small bowel loops extending down to the ileocecal valve with fluid and fecal residue as well as air involving the large bowel. No evidence for transition point could be identified and findings could correspond to the lytic the ileus.    NG tube and Acosta catheter in good position.    Electronically signed by:  Randall Diaz MD  3/18/2023 12:50 AM CDT Workstation: [id number]                                     CTA Chest Non-Coronary (PE Studies) (Final result)  Result time 03/18/23 00:48:13      Final result by Randall Diaz MD (03/18/23 00:48:13)                   Narrative:    EXAM DESCRIPTION: CTA CHEST NON CORONARY (PE STUDIES) 3/18/2023 12:41 AM CDT    CLINICAL HISTORY: 27 years, Female, Pulmonary embolism (PE) suspected, high prob    COMPARISON: None    PROCEDURE:  Multiple transaxial tomograms of the chest were obtained from the lung apices through the lung bases utilizing 1 mm slice thickness at 1 mm interval reconstruction after the administration of 100 cc of Omnipaque 350 for complete opacification of the pulmonary arteries.  Subsequent 3-D maximum intensity projection images were generated in the coronal and sagittal plane for review.  This exam was performed according to our  departmental dose-optimization protocol, which includes automated exposure control, adjustment of the mA and/or kV according to patient size and/or use of iterative reconstruction technique.    FINDINGS:  There is a endotracheal tube and nasogastric tube in good position  The lungs parenchyma demonstrate small focal area of tree-in-bud nodularity involving the apical segment and anterior segment right upper lobe, minimally along the perihilar posterior segment right lower lobe and minimally posterior segment left lower lobe suspicious for focal areas of infection/inflammation, aspiration could be of consideration. No significant masses and/or nodules are identified.  The trachea mainstem bronchus demonstrate to be normal. There is no significant pericardial or pleural effusions.  The thoracic aorta demonstrate to be unremarkable. The heart is normal in size. There are no significant coronary artery calcifications.  There is no significant mediastinal and/or hilar lymphadenopathy. The axillary regions demonstrate to be clear.  Pulmonary arteries demonstrate to be normal, no intraluminal defect are seen that would suggest pulmonary embolus.  The bone windows demonstrate to be unremarkable.  Evaluation of the abdomen will be given in separate report.    IMPRESSION:  No CT evidence of pulmonary embolus.    Endotracheal tube and nasogastric tube in good position.    There is a endotracheal tube and nasogastric tube in good position    The lungs parenchyma demonstrate small focal area of tree-in-bud nodularity involving the apical segment and anterior segment right upper lobe, minimally along the perihilar posterior segment right lower lobe and minimally posterior segment left lower lobe suspicious for focal areas of infection/inflammation, aspiration could be of consideration.    Electronically signed by:  Randall Diaz MD  3/18/2023 12:48 AM CDT Workstation: [id number]                                     CT Cervical  Spine Without Contrast (Final result)  Result time 03/17/23 23:06:23      Final result by Olu Robert DO (03/17/23 23:06:23)                   Narrative:    PROCEDURE:  CT Head and Cervical Spine Without Intravenous Contrast    CLINICAL INDICATION:  The patient is 27 years old and is Female; fall    TECHNIQUE:  Axial computed tomography images of the head/brain and cervical spine without intravenous contrast.  Sagittal and coronal reformatted images were created and reviewed.  This CT exam was performed using one or more of the following dose reduction techniques:  automated exposure control, adjustment of the mA and/or kV according to patient size, and/or use of iterative reconstruction technique.    DLP: 7:15 mGycm    COMPARISON:  CT head without contrast dated 5/22/2021.    FINDINGS:  BRAIN:  Unremarkable.  No hemorrhage.  No significant white matter disease.  No edema.  VENTRICLES:  Unremarkable.  No ventriculomegaly.  SKULL:  No acute fracture.  SINUSES:  Unremarkable as visualized.  No acute sinusitis.  MASTOID AIR CELLS:  Unremarkable as visualized.  No mastoid effusion.  VERTEBRAE:  Unremarkable.  No acute fracture.  Normal alignment.  DISCS/SPINAL CANAL/NEURAL FORAMINA:  No acute findings.  No spinal canal stenosis.  SOFT TISSUES:  Unremarkable.  LUNG APICES:  Visualized lungs are clear.  TUBES, LINES AND DEVICES:  Partially seen endotracheal and orogastric tubes.    IMPRESSION:    1.  No acute intracranial hemorrhage, hydrocephalus or herniation .    2.  No acute cervical spine fracture or subluxation.    Electronically signed by:  Olu Robert DO  3/17/2023 11:06 PM CDT Workstation: [id number]                                     CT Head Without Contrast (Final result)  Result time 03/17/23 23:06:23      Final result by Olu Robert DO (03/17/23 23:06:23)                   Narrative:    PROCEDURE:  CT Head and Cervical Spine Without Intravenous Contrast    CLINICAL INDICATION:  The  patient is 27 years old and is Female; fall    TECHNIQUE:  Axial computed tomography images of the head/brain and cervical spine without intravenous contrast.  Sagittal and coronal reformatted images were created and reviewed.  This CT exam was performed using one or more of the following dose reduction techniques:  automated exposure control, adjustment of the mA and/or kV according to patient size, and/or use of iterative reconstruction technique.    DLP: 7:15 mGycm    COMPARISON:  CT head without contrast dated 5/22/2021.    FINDINGS:  BRAIN:  Unremarkable.  No hemorrhage.  No significant white matter disease.  No edema.  VENTRICLES:  Unremarkable.  No ventriculomegaly.  SKULL:  No acute fracture.  SINUSES:  Unremarkable as visualized.  No acute sinusitis.  MASTOID AIR CELLS:  Unremarkable as visualized.  No mastoid effusion.  VERTEBRAE:  Unremarkable.  No acute fracture.  Normal alignment.  DISCS/SPINAL CANAL/NEURAL FORAMINA:  No acute findings.  No spinal canal stenosis.  SOFT TISSUES:  Unremarkable.  LUNG APICES:  Visualized lungs are clear.  TUBES, LINES AND DEVICES:  Partially seen endotracheal and orogastric tubes.    IMPRESSION:    1.  No acute intracranial hemorrhage, hydrocephalus or herniation .    2.  No acute cervical spine fracture or subluxation.    Electronically signed by:  Olu Robert DO  3/17/2023 11:06 PM CDT Workstation: [id number]                                     X-Ray Chest AP Portable (Final result)  Result time 03/18/23 01:19:18      Final result by Olu Robert DO (03/18/23 01:19:18)                   Narrative:    EXAM DESCRIPTION: XR CHEST AP PORTABLE    CLINICAL HISTORY:27 years Female, Chest Pain    Comparison: None    IMPRESSION:    Endotracheal tube terminating 3.7 cm above the evangelist.  Appropriate placement of nasogastric tube.    Right lung base opacity with small pleural effusion.  No pleural effusion. No pneumothorax.  Cardiomediastinal silhouette is within normal  limits.  No acute osseous abnormality.    Electronically signed by:  Olu Sanchezalin TOBAR  3/18/2023 1:19 AM CDT Workstation: [id number]                                            ASSESSMENT AND PLAN:      Cardiac arrest  Drug abuse  Anoxic brain injury        Plan:   Patient admitted to the hospital after cardiac arrest likely secondary to drug use.  Neurology consulted for anoxic brain injury and prognostication.  On exam, patient has no brainstem reflexes were present. Pupils were asymmetric and nonreactive.  CT head was done on arrival showed no acute abnormalities.   Continuous EEG showed background slowing with very low voltage and intermittent bursts of high-voltage slowing with occasional spikes indicating epileptiform activity.  No clinical or electrographic seizures were recorded.  Given his EEG findings, patient was started on Keppra.   Based on EEG findings and clinical exam, prognosis seems to be poor.  Primary team to discuss with family regarding prognosis and goals of care.  After family discussion, recommend repeat brain imaging to better assess for anoxic injury.  Will follow            Critical Care:  46 minutes of critical care time has been spent evaluating with the patient. Time includes chart review not limited to diagnostic imaging, labs, and vitals, patient assessment, discussion with family and nursing, current order evaluations, and new order entries.     Prem Downey MD  Neurology/vascular Neurology  Date of Service: 03/19/2023  12:06 PM    Please note: This note was transcribed using voice recognition software. Because of this technology there are often uinintended grammatical, spelling, and other transcription errors. Please disregard these errors.

## 2023-03-19 NOTE — CARE UPDATE
03/18/23 1800   Patient Assessment/Suction   Level of Consciousness (AVPU) unresponsive   Respiratory Effort Normal   Expansion/Accessory Muscles/Retractions no use of accessory muscles   All Lung Fields Breath Sounds clear   Rhythm/Pattern, Respiratory assisted mechanically   PRE-TX-O2   Device (Oxygen Therapy) ventilator   $ Is the patient on Low Flow Oxygen? Yes   Oxygen Concentration (%) 35   SpO2 100 %   Pulse Oximetry Type Continuous   $ Pulse Oximetry - Multiple Charge Pulse Oximetry - Multiple   Pulse 72   BP (!) 120/99        Airway - Non-Surgical 03/17/23 2132   Placement Date/Time: 03/17/23 2132   Method of Intubation: Glidescope  Airway Device Size: 7.5  Style: Cuffed  Cuff Inflated With: Air  Secured at: Lips  Insertion attempts (enter comment if more than 2 attempts): 1   Secured at 24 cm   Measured At Lips   Secured Location Left   Secured by Commercial tube mccain   Bite Block none   Site Condition Cool;Dry   Status Intact;Secured;Patent   Site Assessment Clean;Dry;No bleeding;No drainage   Cuff Volume   (MLT)   Vent Select   Conventional Vent Y   Charged w/in last 24h YES   Preset Conventional Ventilator Settings   Vent ID [id number]   Vent Type    Ventilation Type VC   Vent Mode A/C   Humidity HME   Set Rate 30 BPM   Vt Set 500 mL   PEEP/CPAP 5 cmH20   Peak Flow 80 L/min   Peak End Inspiratory Pressure 20 cmH20   I-Trigger Type  V-TRIG   Trigger Sensitivity Flow/I-Trigger 3 L/min   Patient Ventilator Parameters   Resp Rate Total 30 br/min   Peak Airway Pressure 29 cmH20   Mean Airway Pressure 12 cmH20   Plateau Pressure 0 cmH20   Exhaled Vt 519 mL   Total Ve 15.6 L/m   I:E Ratio Measured 1:1.90   Auto PEEP 0 cmH20   Conventional Ventilator Alarms   Alarms On Y   Ve High Alarm 25 L/min   Ve Low Alarm 5 L/min   Vt High Alarm 1200 mL   Vt Low Alarm 200 mL   Resp Rate High Alarm 45 br/min   Press High Alarm 50 cmH2O   Apnea Rate 30   Apnea Volume (mL) 1 mL   Apnea Oxygen Concentration  100   Apnea Flow  Rate (L/min) 80   T Apnea 20 sec(s)   Ready to Wean/Extubation Screen   FIO2<=50 (chart decimal) 0.35   MV<16L (chart vol.) 15.6   PEEP <=8 (chart #) 5   Vital Capacity   Vital Capacity (mL) 0   Education   $ Education Ventilator Oxygen;15 min   Labs   $ Was an ABG obtained? Venous Line;ISTAT - Blood gas   $ Labs Tech Time 15 min   Critical Value Communication   Date Result Received 03/18/23   Time Result Received 1949   Resulting Department of Critical Value RESP   Who communicated critical value from resulting department? SML   Critical Test #1 PH   Critical Test #1 Result 7.34   Critical Test #2 PCO2   Critical Test #2 Result 33   Critical Test #3  HCO3   Critical Test #3 Result 18   Critical Test #4 SAT   Critical Test #4 Result 78   Doctor not notified of critical value due to: known abnormal - expected abnormal   Respiratory Evaluation   $ Care Plan Tech Time 15 min

## 2023-03-19 NOTE — PROGRESS NOTES
"[facility] Medicine Progress Note  Patient Name: Mishel Ruelas MRN: [id number]   Patient Class: IP- Inpatient  Length of Stay: 2   Admission Date: 3/17/2023  9:29 PM Attending Physician: Campbell Interiano MD   Primary Care Provider: Primary Doctor No Face-to-Face encounter date: 03/19/2023   Chief Complaint: Drug Overdose (CARDIAC ARREST. LAST KNOWN ALIVE 2015. )      Subjective:    Interval History   Intubated    Review of Systems   Unable to obtain    Hospital Course        albumin human 25%  25 g Intravenous Once    ceFEPime (MAXIPIME) IVPB  2 g Intravenous Q8H    chlorhexidine  15 mL Mouth/Throat BID    enoxparin  40 mg Subcutaneous Daily    fluconazole  100 mg Oral Daily    hydrocortisone sodium succinate  100 mg Intravenous Q8H    ipratropium  0.5 mg Nebulization Q4H    levalbuterol  1.25 mg Nebulization Q8H    levetiracetam IV  500 mg Intravenous Q12H    mupirocin   Nasal BID    pantoprazole  40 mg Intravenous Daily    potassium chloride  60 mEq Oral Once    thiamine (VITAMIN B1) IVPB  100 mg Intravenous Daily       calcium gluconate IVPB, calcium gluconate IVPB, calcium gluconate IVPB, dextrose 10%, dextrose 10%, glucagon (human recombinant), hydrALAZINE, insulin aspart U-100, magnesium sulfate IVPB, magnesium sulfate IVPB, potassium chloride in water **AND** potassium chloride in water **AND** potassium chloride in water, sodium chloride 0.9%, sodium phosphate IVPB, sodium phosphate IVPB, sodium phosphate IVPB      Objective:   Physical Exam  /68   Pulse 98   Temp (!) 93.6 °F (34.2 °C) (Core Esophageal)   Resp (!) 26   Ht 5' 5" (1.651 m)   Wt 65 kg (143 lb 4.8 oz)   SpO2 98%   BMI 23.85 kg/m²   Constitutional: intubated   HENT: Atraumatic. Pupils unequal  Cardiovascular: Normal rate, regular rhythm and normal heart sounds.   Pulmonary/Chest: occasionally breath over the vent   Abdominal: Soft. Bowel sounds are normal. Exhibits no distension and no mass. No " tenderness  Neurological: sedated   Skin: Skin is warm and dry.     Labs and Imaging    Significant Labs: All pertinent labs within the past 24 hours have been reviewed.    Significant Imaging: I have reviewed all pertinent imaging results/findings within the past 24 hours.    I have reviewed the Vitals, labs and imaging as above.     Assessment & Plan:   Mishel Ruelas is a 27 y.o. female admitted for    Active Hospital Problems    Diagnosis    *Cardiac arrest    Acute hypoxemic respiratory failure    Lactic acidosis    Hypophosphatemia    Hypokalemia    Leukocytosis    Transaminitis    Acute systolic congestive heart failure       Plan  On [facility] by neurology for suspected seizure.   Family would like to proceed with organ donation  Discussed with OSCAR.    D/w with intensivist  D/w neurologist    Poor prognosis.    Active PT: No  Active OT: No  Active SLP: No    Core measures:  - Code status:   - Diet: NPO  VTE Risk Mitigation (From admission, onward)           Ordered     enoxaparin injection 40 mg  Daily         03/18/23 0032     IP VTE LOW RISK PATIENT  Once         03/17/23 2315                    Discharge Planning:   Discharge Planning   KANDY: 03/20    Code Status: Full Code   Is the patient medically ready for discharge?: no    Reason for patient still in hospital (select all that apply): Patient trending condition        Above encounter included review of the medical records, interviewing and examining the patient face-to-face, discussion with family and other health care providers, ordering and interpreting lab/test results and formulating a plan of care.     Medical Decision Making:  [] Low Complexity  [] Moderate Complexity  [x] High Complexity    Campbell Interiano MD  [facility] Hospitalist  03/19/2023

## 2023-03-19 NOTE — RESPIRATORY THERAPY
KATIE at the bedside   03/19/23 1120   Patient Assessment/Suction   Level of Consciousness (AVPU) unresponsive   Respiratory Effort Normal;Unlabored   Expansion/Accessory Muscles/Retractions no use of accessory muscles;no retractions   All Lung Fields Breath Sounds Anterior:;Posterior:;coarse;crackles, fine   Rhythm/Pattern, Respiratory assisted mechanically   Cough Frequency no cough   Suction Method oral;tracheal   Suction Pressure (mmHg) -120 mmHg   $ Suction Charges Inline Suction Procedure Stat Charge   Secretions Amount large   Secretions Color red   Secretions Characteristics thick   $ Swab or suction? Suction   Aspirate Toleration SANDY (no adverse reactions)   Sent to the lab? Yes  (katie collection)   Skin Integrity   $ Wound Care Tech Time 15 min   Area Observed Cheek;Upper lip;Chin   Skin Appearance without discoloration   PRE-TX-O2   Device (Oxygen Therapy) ventilator   $ Is the patient on Low Flow Oxygen? Yes   Oxygen Concentration (%) 25   SpO2 100 %   Pulse (!) 112   Resp (!) 29        Airway - Non-Surgical 03/17/23 2132   Placement Date/Time: 03/17/23 2132   Method of Intubation: Glidescope  Airway Device Size: 7.5  Style: Cuffed  Cuff Inflated With: Air  Secured at: Lips  Insertion attempts (enter comment if more than 2 attempts): 1   Secured at 24 cm   Measured At Teeth   Secured Location (S)  Left   Secured by Commercial tube mccain   Bite Block none   Site Condition Cool   Status Intact;Secured   Site Assessment Clean;Dry   Airway Safety   Ambu bag with the patient? Yes, Adult Ambu   Is mask with the patient? Yes, Adult Mask   O2  at bedside? Yes   Suction set is at the bedside? Yes   Equipment Change   $ RT Equipment HME;Inline suction catheter   Closed Suction System Changed? Yes   Closed Suction System Next Due 03/22/23   Other RT Equipment Changed? Yes   Other Equipment Changed Due Date 03/22/23   Vent Select   Conventional Vent Y   Charged w/in last 24h YES   Preset Conventional  Ventilator Settings   Vent ID [id number]   Vent Type    Ventilation Type VC   Vent Mode A/C   Humidity HME   Set Rate 25 BPM   Vt Set 500 mL   PEEP/CPAP 5 cmH20   Peak Flow 80 L/min   Peak End Inspiratory Pressure 20 cmH20   I-Trigger Type  V-TRIG   Trigger Sensitivity Flow/I-Trigger 3 L/min   Patient Ventilator Parameters   Resp Rate Total 29 br/min   Peak Airway Pressure 32 cmH20   Mean Airway Pressure 13 cmH20   Plateau Pressure 28 cmH20   Exhaled Vt 578 mL   Total Ve 15.1 L/m   I:E Ratio Measured 1:2.50   Auto PEEP 0 cmH20   Conventional Ventilator Alarms   Alarms On Y   Resp Rate High Alarm 45 br/min   Press High Alarm 50 cmH2O   Apnea Rate 30   Apnea Volume (mL) 1 mL   Apnea Oxygen Concentration  100   Apnea Flow Rate (L/min) 80   T Apnea 20 sec(s)   IHI Ventilator Associated Pneumonia Bundle (Required)   Daily Awakening Trials Performed No   Head of Bed Elevated  HOB 30   Oral Care Teeth brushed;Lip moisturizer applied;Mouth swabbed;Mouth moisturizer;Mouth suctioned   Vent Circut Breaks Minimized No (comment)  (alie and iron change out)   Ready to Wean/Extubation Screen   FIO2<=50 (chart decimal) 0.25   MV<16L (chart vol.) 15.1   PEEP <=8 (chart #) 5   Ready to Wean Parameters   F/VT Ratio<105 (RSBI) (!) 50.17   Vital Capacity   Vital Capacity (mL) 0

## 2023-03-19 NOTE — PROGRESS NOTES
Pharmacy Consult Discontinuation: Vancomycin    Mishel Ruelas [id number] is a 27 y.o. female was consulted for vancomycin pharmacotherapy management by pharmacy.    Pharmacy consult for vancomycin dosing is no longer required.  Vancomycin was discontinued 03/19/2023.    Thank you for allowing us to participate in this patient's care. Should you have any questions or concerns please feel free to contact the pharmacy department at [phone].    Brandan Flores, PharmD

## 2023-03-19 NOTE — RESPIRATORY THERAPY
Pat vent on and functional, alarms set and functional, Mechanics unable to be performed   03/19/23 0707   Patient Assessment/Suction   Level of Consciousness (AVPU) unresponsive   Respiratory Effort Normal;Unlabored   Expansion/Accessory Muscles/Retractions no retractions;no use of accessory muscles   All Lung Fields Breath Sounds Anterior:;Lateral:   MARY Breath Sounds clear   LLL Breath Sounds clear   RUL Breath Sounds clear   RML Breath Sounds wheezes, high-pitched   RLL Breath Sounds wheezes, high-pitched   Rhythm/Pattern, Respiratory assisted mechanically   Cough Frequency no cough   Suction Method tracheal   Suction Pressure (mmHg) -120 mmHg   $ Suction Charges Inline Suction Procedure Stat Charge   Secretions Amount scant   Skin Integrity   $ Wound Care Tech Time 15 min   Area Observed Cheek;Upper lip;Lower lip;Corner lip   Skin Appearance without discoloration   PRE-TX-O2   Device (Oxygen Therapy) ventilator   $ Is the patient on Low Flow Oxygen? Yes   Oxygen Concentration (%) 25   SpO2 100 %   Pulse Oximetry Type Continuous   $ Pulse Oximetry - Multiple Charge Pulse Oximetry - Multiple   Pulse 96   Resp (!) 34        Airway - Non-Surgical 03/17/23 2132   Placement Date/Time: 03/17/23 2132   Method of Intubation: Glidescope  Airway Device Size: 7.5  Style: Cuffed  Cuff Inflated With: Air  Secured at: Lips  Insertion attempts (enter comment if more than 2 attempts): 1   Secured at 24 cm   Measured At Teeth   Secured Location (S)  Right  (repositioned ett)   Secured by Commercial tube mccain   Bite Block none   Site Condition Dry   Status Secured;Patent   Site Assessment Clean;Dry;No bleeding;No drainage   General Safety Checklist   Safety Promotion/Fall Prevention side rails raised   Airway Safety   Ambu bag with the patient? Yes, Adult Ambu   Is mask with the patient? Yes, Adult Mask   O2  at bedside? Yes   Suction set is at the bedside? Yes   ETT at Bedside? No   Respiratory Interventions   Cough And  Deep Breathing unable to perform   Airway/Ventilation Management airway patency maintained   Vent Select   Conventional Vent Y   $ Ventilator Subsequent 1   Charged w/in last 24h YES   Preset Conventional Ventilator Settings   Vent ID 7   Vent Type    Ventilation Type VC   Vent Mode A/C   Humidity HME   Set Rate 25 BPM   Vt Set 500 mL   PEEP/CPAP 5 cmH20   Peak Flow 80 L/min   Peak End Inspiratory Pressure 26 cmH20   I-Trigger Type  V-TRIG   Trigger Sensitivity Flow/I-Trigger 3 L/min   Patient Ventilator Parameters   Resp Rate Total 36 br/min   Peak Airway Pressure 30 cmH20   Mean Airway Pressure 15 cmH20   Plateau Pressure 28 cmH20   Exhaled Vt 524 mL   Total Ve 19.7 L/m   I:E Ratio Measured 1:1.60   Auto PEEP 0 cmH20   Conventional Ventilator Alarms   Alarms On Y   Ve High Alarm 25 L/min   Ve Low Alarm 5 L/min   Vt High Alarm 1200 mL   Vt Low Alarm 200 mL   Resp Rate High Alarm 45 br/min   Press High Alarm 50 cmH2O   Apnea Rate 30   Apnea Volume (mL) 1 mL   Apnea Oxygen Concentration  100   Apnea Flow Rate (L/min) 80   T Apnea 20 sec(s)   IHI Ventilator Associated Pneumonia Bundle (Required)   Daily Awakening Trials Performed No   Daily Assessment of Readiness to Extubate No (Comment)   Head of Bed Elevated  HOB 30   Vent Circut Breaks Minimized Yes   Ready to Wean/Extubation Screen   FIO2<=50 (chart decimal) 0.25   MV<16L (chart vol.) (!) 19.7   PEEP <=8 (chart #) 5   Ready to Wean Parameters   F/VT Ratio<105 (RSBI) (!) 64.89   Vital Capacity   Vital Capacity (mL) 0

## 2023-03-19 NOTE — PROCEDURES
CONTINUOUS VIDEO EEG REPORT    NAME: Mishel Ruelas  : 1995  MRN: [id number]    DATE of EEG: 3/18/2023- 3/19/2023  DURATION OF EE HOUR 20 MINUTES    CLINICAL INDICATION: This is a 27 y.o. female with history of IV opioid abuse, seizures, recently released from correction and was known to have been taking Xanax being evaluated for anoxic brain injury after cardiac arrest.    MEDICATIONS:  Propofol, fentanyl    EEG DESCRIPTION:  A 16-channel EEG was performed with electrode placement in 10/20 International System. Longitudinal bipolar and referential montages were utilized in analysis.    This is a technically adequate study with minor muscle and motion artifacts.    There was no clear dominant posterior background rhythm.  The record consisted of low-voltage delta frequency rhythm with intermittent periods of under 2 seconds of bursts of high-voltage slowing with some spikes.    The record was nonreactive.  Sleep architecture was not seen.    No electrographic or electroclinical seizures were recorded.    DIAGNOSIS AND INTERPRETATION: This is an abnormal EEG due to the presence of severe generalised slowing with low-voltage.  There was bursts of generalized slowing with intermittent spikes indicating epileptiform activity.  EEG indicates severe encephalopathy and low seizure threshold.        Prem Downey MD  Neurology

## 2023-03-19 NOTE — RESPIRATORY THERAPY
03/19/23 1802   PRE-TX-O2   Oxygen Concentration (%) (S)  35  (per katie RN)   SpO2 100 %   Pulse (!) 120   Resp (!) 25   Vent Select   Charged w/in last 24h YES   Preset Conventional Ventilator Settings   Ventilation Type VC   Vent Mode A/C   Set Rate 25 BPM   Vt Set 500 mL   PEEP/CPAP 5 cmH20   Peak Flow 80 L/min   Peak End Inspiratory Pressure 19 cmH20   I-Trigger Type  V-TRIG   Trigger Sensitivity Flow/I-Trigger 3 L/min   Patient Ventilator Parameters   Resp Rate Total 25 br/min   Peak Airway Pressure 28 cmH20   Mean Airway Pressure 11 cmH20   Plateau Pressure 28 cmH20   Exhaled Vt 520 mL   Total Ve 13 L/m   I:E Ratio Measured 1:2.50   Auto PEEP 0 cmH20   Conventional Ventilator Alarms   Resp Rate High Alarm 45 br/min   Press High Alarm 50 cmH2O   Apnea Rate 30   Apnea Volume (mL) 1 mL   Apnea Oxygen Concentration  100   Apnea Flow Rate (L/min) 80   T Apnea 20 sec(s)   Ready to Wean/Extubation Screen   FIO2<=50 (chart decimal) 0.35   MV<16L (chart vol.) 13   PEEP <=8 (chart #) 5   Ready to Wean Parameters   F/VT Ratio<105 (RSBI) (!) 48.08   Vital Capacity   Vital Capacity (mL) 0

## 2023-03-19 NOTE — RESPIRATORY THERAPY
03/19/23 1504   Patient Assessment/Suction   Level of Consciousness (AVPU) unresponsive   Respiratory Effort Unlabored   Expansion/Accessory Muscles/Retractions no retractions;no use of accessory muscles   All Lung Fields Breath Sounds Anterior:;Posterior:;equal bilaterally   LLL Breath Sounds clear   RML Breath Sounds wheezes, inspiratory;wheezes, high-pitched   RLL Breath Sounds wheezes, inspiratory;wheezes, high-pitched   Rhythm/Pattern, Respiratory assisted mechanically   Cough Frequency no cough   Suction Method oral;tracheal   Suction Pressure (mmHg) -120 mmHg   $ Suction Charges Inline Suction Procedure Stat Charge   Secretions Amount moderate   Secretions Color rehan  (frothy)   Secretions Characteristics thick   $ Swab or suction? Suction   Aspirate Toleration SANDY (no adverse reactions)   Sent to the lab? Yes   Skin Integrity   $ Wound Care Tech Time 15 min   Area Observed Cheek;Upper lip   Skin Appearance without discoloration   PRE-TX-O2   Device (Oxygen Therapy) ventilator   $ Is the patient on Low Flow Oxygen? Yes   Oxygen Concentration (%) 25   SpO2 97 %   Pulse Oximetry Type Continuous   $ Pulse Oximetry - Multiple Charge Pulse Oximetry - Multiple   Pulse (!) 128   Resp (!) 25   BP (!) 71/48   Aerosol Therapy   $ Aerosol Therapy Charges Aerosol Treatment   Daily Review of Necessity (SVN) completed   Respiratory Treatment Status (SVN) given   Treatment Route (SVN) endotracheal tube;in-line   Patient Position (SVN) HOB elevated   Post Treatment Assessment (SVN) breath sounds unchanged   Signs of Intolerance (SVN) none   Breath Sounds Post-Respiratory Treatment   Throughout All Fields Post-Treatment All Fields   Throughout All Fields Post-Treatment no change   Post-treatment Heart Rate (beats/min) 139   Post-treatment Resp Rate (breaths/min) 25        Airway - Non-Surgical 03/17/23 2132   Placement Date/Time: 03/17/23 2132   Method of Intubation: Glidescope  Airway Device Size: 7.5  Style: Cuffed   Cuff Inflated With: Air  Secured at: Lips  Insertion attempts (enter comment if more than 2 attempts): 1   Secured at 24 cm   Measured At Teeth   Secured Location (S)  Center   Secured by Commercial tube mccain   Bite Block none   Site Condition Cool   Status Intact;Secured   Site Assessment Clean;Dry   Airway Safety   Ambu bag with the patient? Yes, Adult Ambu   Is mask with the patient? Yes, Adult Mask   O2  at bedside? Yes   Suction set is at the bedside? Yes   ETT at Bedside? No   Vent Select   Conventional Vent Y   Charged w/in last 24h YES   Preset Conventional Ventilator Settings   Vent ID 7   Vent Type    Ventilation Type VC   Vent Mode A/C   Humidity HME   Set Rate 25 BPM   Vt Set 500 mL   PEEP/CPAP 5 cmH20   Waveform RAMP   Peak Flow 80 L/min   Peak End Inspiratory Pressure 20 cmH20   I-Trigger Type  V-TRIG   Trigger Sensitivity Flow/I-Trigger 3 L/min   Patient Ventilator Parameters   Resp Rate Total 25 br/min   Peak Airway Pressure 30 cmH20   Mean Airway Pressure 11 cmH20   Plateau Pressure 28 cmH20   Exhaled Vt 515 mL   Total Ve 12.9 L/m   I:E Ratio Measured 1:2.50   Auto PEEP 0 cmH20   Conventional Ventilator Alarms   Alarms On Y   Resp Rate High Alarm 45 br/min   Press High Alarm 50 cmH2O   Apnea Rate 30   Apnea Volume (mL) 1 mL   Apnea Oxygen Concentration  100   Apnea Flow Rate (L/min) 80   T Apnea 20 sec(s)   Ready to Wean/Extubation Screen   FIO2<=50 (chart decimal) 0.25   MV<16L (chart vol.) 12.9   PEEP <=8 (chart #) 5   Ready to Wean Parameters   F/VT Ratio<105 (RSBI) (!) 48.54   Vital Capacity   Vital Capacity (mL) 0

## 2023-03-19 NOTE — CONSULTS
[facility], later [facility]  Vascular Surgery  Consult Note    Inpatient consult to Vascular Surgery  Consult performed by: Ali Khoobehi, MD  Consult ordered by: Quinn Mckenna MD  Reason for consult: OD      Subjective:     Chief Complaint/Reason for Admission: OD    History of Present Illness:  Patient admitted after drug overdose.  Patient on hypothermic protocol.  Patient was noted to have a swollen and mottled left hand, and arterial ultrasound demonstrates left radial artery occlusion.  Since the patient has been warmed her hand is improved and she now has a Doppler signal and a warm left hand.  Patient is intubated and unresponsive.    No medications prior to admission.       Review of patient's allergies indicates:  No Known Allergies    Past Medical History:   Diagnosis Date    Seizures      Past Surgical History:   Procedure Laterality Date    APPENDECTOMY       Family History       Problem Relation (Age of Onset)    Hypertension Father    Thyroid disease Mother          Tobacco Use    Smoking status: Some Days     Packs/day: 0.00     Types: Cigarettes    Smokeless tobacco: Never    Tobacco comments:     patient reports she only smokes 2-3 cigarettes a week    Substance and Sexual Activity    Alcohol use: Yes     Alcohol/week: 1.0 standard drink     Types: 1 Glasses of wine per week    Drug use: Not on file     Comment: SEE PREVIOUS ER VISITS    Sexual activity: Not on file     Review of Systems   Unable to perform ROS: Patient unresponsive   Objective:     Vital Signs (Most Recent):  Temp: (!) 93.6 °F (34.2 °C) (03/19/23 1200)  Pulse: 97 (03/19/23 1430)  Resp: (!) 25 (03/19/23 1430)  BP: (!) 86/56 (03/19/23 1430)  SpO2: 98 % (03/19/23 1430)   Vital Signs (24h Range):  Temp:  [90.7 °F (32.6 °C)-93.6 °F (34.2 °C)] 93.6 °F (34.2 °C)  Pulse:  [] 97  Resp:  [7-36] 25  SpO2:  [98 %-100 %] 98 %  BP: ()/() 86/56  Arterial Line BP: ()/() 90/61     Weight: 65 kg (143 lb 4.8  oz)  Body mass index is 23.85 kg/m².        Physical Exam  Constitutional:       Interventions: She is intubated.   Neck:      Vascular: No carotid bruit.   Cardiovascular:      Rate and Rhythm: Normal rate and regular rhythm.      Heart sounds: Normal heart sounds.      Comments: Sarkis hands warms  Doppler radial signals sarkis UE  2+ edema left hand  Pulmonary:      Effort: Pulmonary effort is normal. She is intubated.      Breath sounds: Normal breath sounds.   Abdominal:      Palpations: Abdomen is soft.      Tenderness: There is no abdominal tenderness.   Skin:     Capillary Refill: Capillary refill takes less than 2 seconds.       Significant Labs:  CBC:   Recent Labs   Lab 03/19/23  1152   WBC 10.97   RBC 4.20   HGB 13.9   HCT 38.5      MCV 92   MCH 33.1*   MCHC 36.1*     CMP:   Recent Labs   Lab 03/19/23  1152   GLU 74   CALCIUM 7.1*   ALBUMIN 3.1*   PROT 5.9*      K 4.1   CO2 18*      BUN 16   CREATININE 0.5   ALKPHOS 52*   ALT 65*   *   BILITOT 0.8     Coagulation: No results for input(s): LABPROT, INR, APTT in the last 48 hours.    Significant Diagnostics:  I have reviewed all pertinent imaging results/findings within the past 24 hours.    Assessment/Plan:   Patient with ischemic left hand likely secondary to the drug overdose and hypotension.  This appears to be improving and may have been vasospasm.  She is not a surgical candidate regardless given her likely poor prognosis.      No surgical intervention indicated.    Continue ICU supportive care, and follow-up with Neurology.    Active Diagnoses:    Diagnosis Date Noted POA    PRINCIPAL PROBLEM:  Cardiac arrest [I46.9] 03/18/2023 Unknown    Acute hypoxemic respiratory failure [J96.01] 03/18/2023 Yes    Lactic acidosis [E87.20] 03/18/2023 Yes    Hypophosphatemia [E83.39] 03/18/2023 Yes    Hypokalemia [E87.6] 03/18/2023 Yes    Leukocytosis [D72.829] 03/18/2023 Yes    Transaminitis [R74.01] 03/18/2023 No    Acute systolic congestive  heart failure [I50.21] 03/18/2023 Yes      Problems Resolved During this Admission:       Thank you for your consult. I will sign off. Please contact us if you have any additional questions.    Ali Khoobehi, MD  Vascular Surgery  [facility]

## 2023-03-19 NOTE — RESPIRATORY THERAPY
Per OSCAR RN,  CPAP 5/5 x40sec,  no respiratory effort.  Changes to vents made per RN   03/19/23 1756   Patient Assessment/Suction   Level of Consciousness (AVPU) unresponsive   Respiratory Effort Unlabored   Rhythm/Pattern, Respiratory assisted mechanically   Cough Frequency no cough   PRE-TX-O2   Device (Oxygen Therapy) ventilator   Oxygen Concentration (%) 100   SpO2 100 %   Pulse Oximetry Type Continuous   $ Pulse Oximetry - Multiple Charge Pulse Oximetry - Multiple   Pulse (!) 120   Resp (!) 25        Airway - Non-Surgical 03/17/23 2132   Placement Date/Time: 03/17/23 2132   Method of Intubation: Glidescope  Airway Device Size: 7.5  Style: Cuffed  Cuff Inflated With: Air  Secured at: Lips  Insertion attempts (enter comment if more than 2 attempts): 1   Secured at 24 cm   Measured At Teeth   Secured Location Center   Secured by Commercial tube mccain   Bite Block none   Site Condition Cool   Status Intact;Secured   Site Assessment Clean;Dry   Airway Safety   Ambu bag with the patient? Yes, Adult Ambu   Is mask with the patient? Yes, Adult Mask   Suction set is at the bedside? Yes   ETT at Bedside? No   Vent Select   Conventional Vent Y   Charged w/in last 24h YES   Preset Conventional Ventilator Settings   Vent ID 7   Vent Type    Ventilation Type VC   Vent Mode A/C   Set Rate 25 BPM   Vt Set 500 mL   PEEP/CPAP 5 cmH20   Peak Flow 80 L/min   Peak End Inspiratory Pressure 19 cmH20   I-Trigger Type  V-TRIG   Trigger Sensitivity Flow/I-Trigger 3 L/min   Patient Ventilator Parameters   Resp Rate Total 25 br/min   Peak Airway Pressure 30 cmH20   Mean Airway Pressure 11 cmH20   Plateau Pressure 28 cmH20   Exhaled Vt 520 mL   Total Ve 13 L/m   I:E Ratio Measured 1:2.50   Auto PEEP 0 cmH20   Conventional Ventilator Alarms   Resp Rate High Alarm 45 br/min   Press High Alarm 50 cmH2O   Apnea Rate 30   Apnea Volume (mL) 1 mL   Apnea Oxygen Concentration  100   Apnea Flow Rate (L/min) 80   T Apnea 20 sec(s)   Ready to  Wean/Extubation Screen   FIO2<=50 (chart decimal) (!) 1   MV<16L (chart vol.) 13   PEEP <=8 (chart #) 5   Ready to Wean Parameters   F/VT Ratio<105 (RSBI) (!) 48.08   Vital Capacity   Vital Capacity (mL) 0   Labs   $ Was an ABG obtained? Arterial Blood Draw from Existing Line;ISTAT - Blood gas   $ Labs Tech Time 15 min   Critical Value Communication   Date Result Received 03/19/23   Time Result Received 1756   Who communicated critical value from resulting department? J Contreras   Name of Notified Physician/Designee OSCAR RN   Date Notified 03/19/23   Time Notified 1800   Read Back Verification Yes

## 2023-03-20 ENCOUNTER — HOSPITAL ENCOUNTER (INPATIENT)
Facility: HOSPITAL | Age: 28
LOS: 1 days | DRG: 081 | End: 2023-03-20
Payer: COMMERCIAL

## 2023-03-20 VITALS
RESPIRATION RATE: 16 BRPM | SYSTOLIC BLOOD PRESSURE: 99 MMHG | BODY MASS INDEX: 23.85 KG/M2 | HEART RATE: 122 BPM | OXYGEN SATURATION: 100 % | DIASTOLIC BLOOD PRESSURE: 65 MMHG | WEIGHT: 143.31 LBS | TEMPERATURE: 98 F

## 2023-03-20 VITALS
HEIGHT: 65 IN | SYSTOLIC BLOOD PRESSURE: 123 MMHG | WEIGHT: 143.31 LBS | RESPIRATION RATE: 14 BRPM | DIASTOLIC BLOOD PRESSURE: 87 MMHG | HEART RATE: 125 BPM | OXYGEN SATURATION: 100 % | TEMPERATURE: 97 F | BODY MASS INDEX: 23.88 KG/M2

## 2023-03-20 DIAGNOSIS — G93.1 ANOXIC BRAIN INJURY: ICD-10-CM

## 2023-03-20 DIAGNOSIS — Z52.9 ORGAN DONATION: ICD-10-CM

## 2023-03-20 LAB
ALBUMIN SERPL BCP-MCNC: 3.2 G/DL (ref 3.5–5.2)
ALBUMIN SERPL BCP-MCNC: 3.3 G/DL (ref 3.5–5.2)
ALBUMIN SERPL BCP-MCNC: 3.9 G/DL (ref 3.5–5.2)
ALBUMIN SERPL BCP-MCNC: 4 G/DL (ref 3.5–5.2)
ALLENS TEST: ABNORMAL
ALP SERPL-CCNC: 39 U/L (ref 55–135)
ALP SERPL-CCNC: 49 U/L (ref 55–135)
ALP SERPL-CCNC: 50 U/L (ref 55–135)
ALP SERPL-CCNC: 52 U/L (ref 55–135)
ALT SERPL W/O P-5'-P-CCNC: 32 U/L (ref 10–44)
ALT SERPL W/O P-5'-P-CCNC: 42 U/L (ref 10–44)
ALT SERPL W/O P-5'-P-CCNC: 46 U/L (ref 10–44)
ALT SERPL W/O P-5'-P-CCNC: 48 U/L (ref 10–44)
AMYLASE SERPL-CCNC: 59 U/L (ref 20–110)
AMYLASE SERPL-CCNC: 62 U/L (ref 20–110)
AMYLASE SERPL-CCNC: 66 U/L (ref 20–110)
AMYLASE SERPL-CCNC: 80 U/L (ref 20–110)
ANION GAP SERPL CALC-SCNC: 4 MMOL/L (ref 8–16)
ANION GAP SERPL CALC-SCNC: 8 MMOL/L (ref 8–16)
ANION GAP SERPL CALC-SCNC: 9 MMOL/L (ref 8–16)
ANION GAP SERPL CALC-SCNC: 9 MMOL/L (ref 8–16)
APTT BLDCRRT: 24.3 SEC (ref 21–32)
APTT BLDCRRT: 30.7 SEC (ref 21–32)
APTT BLDCRRT: 32.8 SEC (ref 21–32)
APTT BLDCRRT: 34.5 SEC (ref 21–32)
AST SERPL-CCNC: 23 U/L (ref 10–40)
AST SERPL-CCNC: 36 U/L (ref 10–40)
AST SERPL-CCNC: 49 U/L (ref 10–40)
AST SERPL-CCNC: 61 U/L (ref 10–40)
BASOPHILS # BLD AUTO: 0.01 K/UL (ref 0–0.2)
BASOPHILS NFR BLD: 0.1 % (ref 0–1.9)
BILIRUB DIRECT SERPL-MCNC: 0.2 MG/DL (ref 0.1–0.3)
BILIRUB SERPL-MCNC: 0.8 MG/DL (ref 0.1–1)
BILIRUB SERPL-MCNC: 0.9 MG/DL (ref 0.1–1)
BUN SERPL-MCNC: 11 MG/DL (ref 6–20)
BUN SERPL-MCNC: 8 MG/DL (ref 6–20)
BUN SERPL-MCNC: 8 MG/DL (ref 6–20)
BUN SERPL-MCNC: 9 MG/DL (ref 6–20)
CALCIUM SERPL-MCNC: 7.1 MG/DL (ref 8.7–10.5)
CALCIUM SERPL-MCNC: 7.5 MG/DL (ref 8.7–10.5)
CALCIUM SERPL-MCNC: 8.3 MG/DL (ref 8.7–10.5)
CALCIUM SERPL-MCNC: 9 MG/DL (ref 8.7–10.5)
CHLORIDE SERPL-SCNC: 107 MMOL/L (ref 95–110)
CHLORIDE SERPL-SCNC: 108 MMOL/L (ref 95–110)
CHLORIDE SERPL-SCNC: 111 MMOL/L (ref 95–110)
CHLORIDE SERPL-SCNC: 118 MMOL/L (ref 95–110)
CK MB SERPL-MCNC: 11.4 NG/ML (ref 0.1–6.5)
CK MB SERPL-MCNC: 16.7 NG/ML (ref 0.1–6.5)
CK MB SERPL-MCNC: 24.2 NG/ML (ref 0.1–6.5)
CK MB SERPL-MCNC: 36 NG/ML (ref 0.1–6.5)
CK SERPL-CCNC: 102 U/L (ref 20–180)
CK SERPL-CCNC: 153 U/L (ref 20–180)
CO2 SERPL-SCNC: 18 MMOL/L (ref 23–29)
CO2 SERPL-SCNC: 19 MMOL/L (ref 23–29)
CO2 SERPL-SCNC: 23 MMOL/L (ref 23–29)
CO2 SERPL-SCNC: 23 MMOL/L (ref 23–29)
CREAT SERPL-MCNC: 0.6 MG/DL (ref 0.5–1.4)
CREAT SERPL-MCNC: 0.7 MG/DL (ref 0.5–1.4)
DELSYS: ABNORMAL
DIFFERENTIAL METHOD: ABNORMAL
EOSINOPHIL # BLD AUTO: 0 K/UL (ref 0–0.5)
EOSINOPHIL NFR BLD: 0 % (ref 0–8)
ERYTHROCYTE [DISTWIDTH] IN BLOOD BY AUTOMATED COUNT: 12 % (ref 11.5–14.5)
ERYTHROCYTE [DISTWIDTH] IN BLOOD BY AUTOMATED COUNT: 12.3 % (ref 11.5–14.5)
ERYTHROCYTE [DISTWIDTH] IN BLOOD BY AUTOMATED COUNT: 12.4 % (ref 11.5–14.5)
ERYTHROCYTE [DISTWIDTH] IN BLOOD BY AUTOMATED COUNT: 12.5 % (ref 11.5–14.5)
ERYTHROCYTE [SEDIMENTATION RATE] IN BLOOD BY WESTERGREN METHOD: 14 MM/H
ERYTHROCYTE [SEDIMENTATION RATE] IN BLOOD BY WESTERGREN METHOD: 16 MM/H
ERYTHROCYTE [SEDIMENTATION RATE] IN BLOOD BY WESTERGREN METHOD: 16 MM/H
ERYTHROCYTE [SEDIMENTATION RATE] IN BLOOD BY WESTERGREN METHOD: 25 MM/H
EST. GFR  (NO RACE VARIABLE): >60 ML/MIN/1.73 M^2
ESTIMATED AVG GLUCOSE: 105 MG/DL (ref 68–131)
FIO2: 100
FIO2: 35
FIO2: 40
GGT SERPL-CCNC: 16 U/L (ref 8–55)
GGT SERPL-CCNC: 20 U/L (ref 8–55)
GGT SERPL-CCNC: 21 U/L (ref 8–55)
GGT SERPL-CCNC: 21 U/L (ref 8–55)
GLUCOSE SERPL-MCNC: 122 MG/DL (ref 70–110)
GLUCOSE SERPL-MCNC: 122 MG/DL (ref 70–110)
GLUCOSE SERPL-MCNC: 123 MG/DL (ref 70–110)
GLUCOSE SERPL-MCNC: 124 MG/DL (ref 70–110)
GLUCOSE SERPL-MCNC: 126 MG/DL (ref 70–110)
GLUCOSE SERPL-MCNC: 128 MG/DL (ref 70–110)
GLUCOSE SERPL-MCNC: 137 MG/DL (ref 70–110)
GLUCOSE SERPL-MCNC: 150 MG/DL (ref 70–110)
HBA1C MFR BLD: 5.3 % (ref 4.5–6.2)
HCO3 UR-SCNC: 20.8 MMOL/L (ref 24–28)
HCO3 UR-SCNC: 21.2 MMOL/L (ref 24–28)
HCO3 UR-SCNC: 21.9 MMOL/L (ref 24–28)
HCO3 UR-SCNC: 24.1 MMOL/L (ref 24–28)
HCO3 UR-SCNC: 24.6 MMOL/L (ref 24–28)
HCO3 UR-SCNC: 25.3 MMOL/L (ref 24–28)
HCO3 UR-SCNC: 27.6 MMOL/L (ref 24–28)
HCT VFR BLD AUTO: 29.3 % (ref 37–48.5)
HCT VFR BLD AUTO: 29.6 % (ref 37–48.5)
HCT VFR BLD AUTO: 29.9 % (ref 37–48.5)
HCT VFR BLD AUTO: 31.1 % (ref 37–48.5)
HCT VFR BLD CALC: 28 %PCV (ref 36–54)
HCT VFR BLD CALC: 29 %PCV (ref 36–54)
HCT VFR BLD CALC: 30 %PCV (ref 36–54)
HGB BLD-MCNC: 10.2 G/DL (ref 12–16)
HGB BLD-MCNC: 10.5 G/DL (ref 12–16)
HGB BLD-MCNC: 10.7 G/DL (ref 12–16)
HGB BLD-MCNC: 10.8 G/DL (ref 12–16)
IMM GRANULOCYTES # BLD AUTO: 0.09 K/UL (ref 0–0.04)
IMM GRANULOCYTES # BLD AUTO: 0.09 K/UL (ref 0–0.04)
IMM GRANULOCYTES # BLD AUTO: 0.12 K/UL (ref 0–0.04)
IMM GRANULOCYTES # BLD AUTO: 0.16 K/UL (ref 0–0.04)
IMM GRANULOCYTES NFR BLD AUTO: 0.8 % (ref 0–0.5)
IMM GRANULOCYTES NFR BLD AUTO: 0.8 % (ref 0–0.5)
IMM GRANULOCYTES NFR BLD AUTO: 1.2 % (ref 0–0.5)
IMM GRANULOCYTES NFR BLD AUTO: 1.5 % (ref 0–0.5)
INR PPP: 1 (ref 0.8–1.2)
INR PPP: 1.1 (ref 0.8–1.2)
INR PPP: 1.1 (ref 0.8–1.2)
INR PPP: 1.2 (ref 0.8–1.2)
LACTATE SERPL-SCNC: 0.9 MMOL/L (ref 0.5–1.9)
LACTATE SERPL-SCNC: 1.4 MMOL/L (ref 0.5–1.9)
LACTATE SERPL-SCNC: 1.6 MMOL/L (ref 0.5–1.9)
LDH SERPL L TO P-CCNC: 216 U/L (ref 110–260)
LDH SERPL L TO P-CCNC: 249 U/L (ref 110–260)
LDH SERPL L TO P-CCNC: 271 U/L (ref 110–260)
LDH SERPL L TO P-CCNC: 277 U/L (ref 110–260)
LIPASE SERPL-CCNC: 18 U/L (ref 4–60)
LIPASE SERPL-CCNC: 20 U/L (ref 4–60)
LIPASE SERPL-CCNC: 22 U/L (ref 4–60)
LIPASE SERPL-CCNC: 31 U/L (ref 4–60)
LYMPHOCYTES # BLD AUTO: 0.4 K/UL (ref 1–4.8)
LYMPHOCYTES # BLD AUTO: 0.6 K/UL (ref 1–4.8)
LYMPHOCYTES # BLD AUTO: 0.6 K/UL (ref 1–4.8)
LYMPHOCYTES # BLD AUTO: 1.1 K/UL (ref 1–4.8)
LYMPHOCYTES NFR BLD: 11.2 % (ref 18–48)
LYMPHOCYTES NFR BLD: 4.1 % (ref 18–48)
LYMPHOCYTES NFR BLD: 5.5 % (ref 18–48)
LYMPHOCYTES NFR BLD: 6 % (ref 18–48)
MAGNESIUM SERPL-MCNC: 1.9 MG/DL (ref 1.6–2.6)
MAGNESIUM SERPL-MCNC: 2.1 MG/DL (ref 1.6–2.6)
MAGNESIUM SERPL-MCNC: 2.3 MG/DL (ref 1.6–2.6)
MAGNESIUM SERPL-MCNC: 2.4 MG/DL (ref 1.6–2.6)
MCH RBC QN AUTO: 33.1 PG (ref 27–31)
MCH RBC QN AUTO: 33.2 PG (ref 27–31)
MCH RBC QN AUTO: 33.2 PG (ref 27–31)
MCH RBC QN AUTO: 33.3 PG (ref 27–31)
MCHC RBC AUTO-ENTMCNC: 34.7 G/DL (ref 32–36)
MCHC RBC AUTO-ENTMCNC: 34.8 G/DL (ref 32–36)
MCHC RBC AUTO-ENTMCNC: 35.5 G/DL (ref 32–36)
MCHC RBC AUTO-ENTMCNC: 35.8 G/DL (ref 32–36)
MCV RBC AUTO: 93 FL (ref 82–98)
MCV RBC AUTO: 94 FL (ref 82–98)
MCV RBC AUTO: 96 FL (ref 82–98)
MCV RBC AUTO: 96 FL (ref 82–98)
MIN VOL: 6.89
MIN VOL: 7.2
MODE: ABNORMAL
MONOCYTES # BLD AUTO: 0.3 K/UL (ref 0.3–1)
MONOCYTES # BLD AUTO: 0.3 K/UL (ref 0.3–1)
MONOCYTES # BLD AUTO: 0.4 K/UL (ref 0.3–1)
MONOCYTES # BLD AUTO: 0.5 K/UL (ref 0.3–1)
MONOCYTES NFR BLD: 2.6 % (ref 4–15)
MONOCYTES NFR BLD: 3 % (ref 4–15)
MONOCYTES NFR BLD: 3.3 % (ref 4–15)
MONOCYTES NFR BLD: 4.4 % (ref 4–15)
NEUTROPHILS # BLD AUTO: 8.5 K/UL (ref 1.8–7.7)
NEUTROPHILS # BLD AUTO: 9.6 K/UL (ref 1.8–7.7)
NEUTROPHILS # BLD AUTO: 9.6 K/UL (ref 1.8–7.7)
NEUTROPHILS # BLD AUTO: 9.8 K/UL (ref 1.8–7.7)
NEUTROPHILS NFR BLD: 83.1 % (ref 38–73)
NEUTROPHILS NFR BLD: 90.5 % (ref 38–73)
NEUTROPHILS NFR BLD: 90.6 % (ref 38–73)
NEUTROPHILS NFR BLD: 91 % (ref 38–73)
NRBC BLD-RTO: 0 /100 WBC
PCO2 BLDA: 27.3 MMHG (ref 35–45)
PCO2 BLDA: 31.1 MMHG (ref 35–45)
PCO2 BLDA: 36 MMHG (ref 35–45)
PCO2 BLDA: 38.8 MMHG (ref 35–45)
PCO2 BLDA: 43.9 MMHG (ref 35–45)
PCO2 BLDA: 46.8 MMHG (ref 35–45)
PCO2 BLDA: 50.4 MMHG (ref 35–45)
PEEP: 5
PEEP: 8
PH SMN: 7.31 [PH] (ref 7.35–7.45)
PH SMN: 7.36 [PH] (ref 7.35–7.45)
PH SMN: 7.37 [PH] (ref 7.35–7.45)
PH SMN: 7.38 [PH] (ref 7.35–7.45)
PH SMN: 7.4 [PH] (ref 7.35–7.45)
PH SMN: 7.44 [PH] (ref 7.35–7.45)
PH SMN: 7.51 [PH] (ref 7.35–7.45)
PHOSPHATE SERPL-MCNC: 1.4 MG/DL (ref 2.7–4.5)
PHOSPHATE SERPL-MCNC: 2.8 MG/DL (ref 2.7–4.5)
PHOSPHATE SERPL-MCNC: 2.9 MG/DL (ref 2.7–4.5)
PHOSPHATE SERPL-MCNC: 2.9 MG/DL (ref 2.7–4.5)
PIP: 20
PIP: 30
PLATELET # BLD AUTO: 103 K/UL (ref 150–450)
PLATELET # BLD AUTO: 104 K/UL (ref 150–450)
PLATELET # BLD AUTO: 125 K/UL (ref 150–450)
PLATELET # BLD AUTO: 129 K/UL (ref 150–450)
PLATELET BLD QL SMEAR: ABNORMAL
PMV BLD AUTO: 11.1 FL (ref 9.2–12.9)
PMV BLD AUTO: 11.3 FL (ref 9.2–12.9)
PMV BLD AUTO: 11.4 FL (ref 9.2–12.9)
PMV BLD AUTO: 11.5 FL (ref 9.2–12.9)
PO2 BLDA: 138 MMHG (ref 80–100)
PO2 BLDA: 171 MMHG (ref 80–100)
PO2 BLDA: 177 MMHG (ref 80–100)
PO2 BLDA: 302 MMHG (ref 80–100)
PO2 BLDA: 532 MMHG (ref 80–100)
PO2 BLDA: 556 MMHG (ref 80–100)
PO2 BLDA: 592 MMHG (ref 80–100)
POC BE: -1 MMOL/L
POC BE: -3 MMOL/L
POC BE: -4 MMOL/L
POC BE: 2 MMOL/L
POC IONIZED CALCIUM: 1.09 MMOL/L (ref 1.06–1.42)
POC IONIZED CALCIUM: 1.18 MMOL/L (ref 1.06–1.42)
POC IONIZED CALCIUM: 1.33 MMOL/L (ref 1.06–1.42)
POC PCO2 TEMP: 48 MMHG
POC PH TEMP: 7.33
POC PO2 TEMP: 296 MMHG
POC SATURATED O2: 100 % (ref 95–100)
POC SATURATED O2: 99 % (ref 95–100)
POC TCO2: 22 MMOL/L (ref 23–27)
POC TCO2: 22 MMOL/L (ref 23–27)
POC TCO2: 23 MMOL/L (ref 23–27)
POC TCO2: 25 MMOL/L (ref 23–27)
POC TCO2: 26 MMOL/L (ref 23–27)
POC TCO2: 27 MMOL/L (ref 23–27)
POC TCO2: 29 MMOL/L (ref 23–27)
POC TEMPERATURE: ABNORMAL
POTASSIUM BLD-SCNC: 2.1 MMOL/L (ref 3.5–5.1)
POTASSIUM BLD-SCNC: 3.8 MMOL/L (ref 3.5–5.1)
POTASSIUM BLD-SCNC: 4.2 MMOL/L (ref 3.5–5.1)
POTASSIUM SERPL-SCNC: 2.5 MMOL/L (ref 3.5–5.1)
POTASSIUM SERPL-SCNC: 3.7 MMOL/L (ref 3.5–5.1)
POTASSIUM SERPL-SCNC: 4.1 MMOL/L (ref 3.5–5.1)
POTASSIUM SERPL-SCNC: 4.4 MMOL/L (ref 3.5–5.1)
PROT SERPL-MCNC: 5.3 G/DL (ref 6–8.4)
PROT SERPL-MCNC: 5.5 G/DL (ref 6–8.4)
PROT SERPL-MCNC: 6.1 G/DL (ref 6–8.4)
PROT SERPL-MCNC: 6.3 G/DL (ref 6–8.4)
PROTHROMBIN TIME: 11.1 SEC (ref 9–12.5)
PROTHROMBIN TIME: 11.3 SEC (ref 9–12.5)
PROTHROMBIN TIME: 11.9 SEC (ref 9–12.5)
PROTHROMBIN TIME: 12.4 SEC (ref 9–12.5)
RBC # BLD AUTO: 3.06 M/UL (ref 4–5.4)
RBC # BLD AUTO: 3.16 M/UL (ref 4–5.4)
RBC # BLD AUTO: 3.23 M/UL (ref 4–5.4)
RBC # BLD AUTO: 3.25 M/UL (ref 4–5.4)
SAMPLE: ABNORMAL
SITE: ABNORMAL
SODIUM BLD-SCNC: 137 MMOL/L (ref 136–145)
SODIUM BLD-SCNC: 138 MMOL/L (ref 136–145)
SODIUM BLD-SCNC: 150 MMOL/L (ref 136–145)
SODIUM SERPL-SCNC: 134 MMOL/L (ref 136–145)
SODIUM SERPL-SCNC: 135 MMOL/L (ref 136–145)
SODIUM SERPL-SCNC: 143 MMOL/L (ref 136–145)
SODIUM SERPL-SCNC: 145 MMOL/L (ref 136–145)
SP02: 100
SP02: 100
SP02: 98
SP02: 99
TROPONIN I SERPL HS-MCNC: 1027 PG/ML (ref 0–14.9)
TROPONIN I SERPL HS-MCNC: 1156.3 PG/ML (ref 0–14.9)
TROPONIN I SERPL HS-MCNC: 1526.4 PG/ML (ref 0–14.9)
TROPONIN I SERPL HS-MCNC: 2218.7 PG/ML (ref 0–14.9)
VT: 500
WBC # BLD AUTO: 10.22 K/UL (ref 3.9–12.7)
WBC # BLD AUTO: 10.52 K/UL (ref 3.9–12.7)
WBC # BLD AUTO: 10.63 K/UL (ref 3.9–12.7)
WBC # BLD AUTO: 10.76 K/UL (ref 3.9–12.7)

## 2023-03-20 PROCEDURE — 99900035 HC TECH TIME PER 15 MIN (STAT)

## 2023-03-20 PROCEDURE — 82330 ASSAY OF CALCIUM: CPT

## 2023-03-20 PROCEDURE — 82150 ASSAY OF AMYLASE: CPT | Mod: 91 | Performed by: INTERNAL MEDICINE

## 2023-03-20 PROCEDURE — 31622 DX BRONCHOSCOPE/WASH: CPT | Performed by: INTERNAL MEDICINE

## 2023-03-20 PROCEDURE — 85025 COMPLETE CBC W/AUTO DIFF WBC: CPT | Mod: 91 | Performed by: INTERNAL MEDICINE

## 2023-03-20 PROCEDURE — 27201114 HC TRAP (ANY): Performed by: INTERNAL MEDICINE

## 2023-03-20 PROCEDURE — 82248 BILIRUBIN DIRECT: CPT | Mod: 91

## 2023-03-20 PROCEDURE — 82977 ASSAY OF GGT: CPT | Mod: 91 | Performed by: INTERNAL MEDICINE

## 2023-03-20 PROCEDURE — 94760 N-INVAS EAR/PLS OXIMETRY 1: CPT

## 2023-03-20 PROCEDURE — 83615 LACTATE (LD) (LDH) ENZYME: CPT | Mod: 91

## 2023-03-20 PROCEDURE — 83690 ASSAY OF LIPASE: CPT | Mod: 91 | Performed by: INTERNAL MEDICINE

## 2023-03-20 PROCEDURE — 27000679 HC ADAPTOR, BRONCHOSCOPE: Performed by: INTERNAL MEDICINE

## 2023-03-20 PROCEDURE — 83605 ASSAY OF LACTIC ACID: CPT | Mod: 91 | Performed by: INTERNAL MEDICINE

## 2023-03-20 PROCEDURE — 94003 VENT MGMT INPAT SUBQ DAY: CPT

## 2023-03-20 PROCEDURE — 85730 THROMBOPLASTIN TIME PARTIAL: CPT | Performed by: INTERNAL MEDICINE

## 2023-03-20 PROCEDURE — 84295 ASSAY OF SERUM SODIUM: CPT

## 2023-03-20 PROCEDURE — 82150 ASSAY OF AMYLASE: CPT | Performed by: INTERNAL MEDICINE

## 2023-03-20 PROCEDURE — 83735 ASSAY OF MAGNESIUM: CPT | Mod: 91

## 2023-03-20 PROCEDURE — 85730 THROMBOPLASTIN TIME PARTIAL: CPT | Mod: 91 | Performed by: INTERNAL MEDICINE

## 2023-03-20 PROCEDURE — 63600175 PHARM REV CODE 636 W HCPCS

## 2023-03-20 PROCEDURE — 82553 CREATINE MB FRACTION: CPT | Performed by: INTERNAL MEDICINE

## 2023-03-20 PROCEDURE — 25000003 PHARM REV CODE 250: Mod: TB,JG | Performed by: INTERNAL MEDICINE

## 2023-03-20 PROCEDURE — 27000221 HC OXYGEN, UP TO 24 HOURS

## 2023-03-20 PROCEDURE — 83735 ASSAY OF MAGNESIUM: CPT | Mod: 91 | Performed by: INTERNAL MEDICINE

## 2023-03-20 PROCEDURE — 94640 AIRWAY INHALATION TREATMENT: CPT

## 2023-03-20 PROCEDURE — 25000003 PHARM REV CODE 250: Performed by: EMERGENCY MEDICINE

## 2023-03-20 PROCEDURE — 84100 ASSAY OF PHOSPHORUS: CPT | Performed by: INTERNAL MEDICINE

## 2023-03-20 PROCEDURE — 31622 DX BRONCHOSCOPE/WASH: CPT | Mod: ,,, | Performed by: INTERNAL MEDICINE

## 2023-03-20 PROCEDURE — 84484 ASSAY OF TROPONIN QUANT: CPT | Mod: 91 | Performed by: INTERNAL MEDICINE

## 2023-03-20 PROCEDURE — 82803 BLOOD GASES ANY COMBINATION: CPT

## 2023-03-20 PROCEDURE — 25000003 PHARM REV CODE 250: Performed by: INTERNAL MEDICINE

## 2023-03-20 PROCEDURE — 82150 ASSAY OF AMYLASE: CPT | Mod: 91

## 2023-03-20 PROCEDURE — 85730 THROMBOPLASTIN TIME PARTIAL: CPT | Mod: 91

## 2023-03-20 PROCEDURE — 83690 ASSAY OF LIPASE: CPT | Performed by: INTERNAL MEDICINE

## 2023-03-20 PROCEDURE — 82550 ASSAY OF CK (CPK): CPT

## 2023-03-20 PROCEDURE — 37799 UNLISTED PX VASCULAR SURGERY: CPT

## 2023-03-20 PROCEDURE — 63600175 PHARM REV CODE 636 W HCPCS: Performed by: INTERNAL MEDICINE

## 2023-03-20 PROCEDURE — 99900026 HC AIRWAY MAINTENANCE (STAT)

## 2023-03-20 PROCEDURE — 83735 ASSAY OF MAGNESIUM: CPT | Performed by: INTERNAL MEDICINE

## 2023-03-20 PROCEDURE — 99233 PR SUBSEQUENT HOSPITAL CARE,LEVL III: ICD-10-PCS | Mod: 25,,, | Performed by: INTERNAL MEDICINE

## 2023-03-20 PROCEDURE — 80053 COMPREHEN METABOLIC PANEL: CPT | Mod: 91 | Performed by: INTERNAL MEDICINE

## 2023-03-20 PROCEDURE — 82248 BILIRUBIN DIRECT: CPT | Mod: 91 | Performed by: INTERNAL MEDICINE

## 2023-03-20 PROCEDURE — 80053 COMPREHEN METABOLIC PANEL: CPT | Mod: 91

## 2023-03-20 PROCEDURE — 63600175 PHARM REV CODE 636 W HCPCS: Mod: JZ,JG | Performed by: INTERNAL MEDICINE

## 2023-03-20 PROCEDURE — 31622 PR BRONCHOSCOPY,DIAGNOSTIC: ICD-10-PCS | Mod: ,,, | Performed by: INTERNAL MEDICINE

## 2023-03-20 PROCEDURE — 82553 CREATINE MB FRACTION: CPT | Mod: 91

## 2023-03-20 PROCEDURE — 83615 LACTATE (LD) (LDH) ENZYME: CPT | Performed by: INTERNAL MEDICINE

## 2023-03-20 PROCEDURE — 25000003 PHARM REV CODE 250

## 2023-03-20 PROCEDURE — 25000242 PHARM REV CODE 250 ALT 637 W/ HCPCS: Performed by: INTERNAL MEDICINE

## 2023-03-20 PROCEDURE — 84100 ASSAY OF PHOSPHORUS: CPT | Mod: 91 | Performed by: INTERNAL MEDICINE

## 2023-03-20 PROCEDURE — 99233 SBSQ HOSP IP/OBS HIGH 50: CPT | Mod: 25,,, | Performed by: INTERNAL MEDICINE

## 2023-03-20 PROCEDURE — 36600 WITHDRAWAL OF ARTERIAL BLOOD: CPT

## 2023-03-20 PROCEDURE — 99900031 HC PATIENT EDUCATION (STAT)

## 2023-03-20 PROCEDURE — C9113 INJ PANTOPRAZOLE SODIUM, VIA: HCPCS | Performed by: INTERNAL MEDICINE

## 2023-03-20 PROCEDURE — 83615 LACTATE (LD) (LDH) ENZYME: CPT | Mod: 91 | Performed by: INTERNAL MEDICINE

## 2023-03-20 PROCEDURE — 84132 ASSAY OF SERUM POTASSIUM: CPT

## 2023-03-20 PROCEDURE — 84100 ASSAY OF PHOSPHORUS: CPT | Mod: 91

## 2023-03-20 PROCEDURE — 82977 ASSAY OF GGT: CPT | Performed by: INTERNAL MEDICINE

## 2023-03-20 PROCEDURE — 94761 N-INVAS EAR/PLS OXIMETRY MLT: CPT

## 2023-03-20 PROCEDURE — 36415 COLL VENOUS BLD VENIPUNCTURE: CPT

## 2023-03-20 PROCEDURE — 82248 BILIRUBIN DIRECT: CPT | Performed by: INTERNAL MEDICINE

## 2023-03-20 PROCEDURE — 93010 ELECTROCARDIOGRAM REPORT: CPT | Mod: ,,, | Performed by: INTERNAL MEDICINE

## 2023-03-20 PROCEDURE — 85014 HEMATOCRIT: CPT

## 2023-03-20 PROCEDURE — 85610 PROTHROMBIN TIME: CPT | Performed by: INTERNAL MEDICINE

## 2023-03-20 PROCEDURE — 93005 ELECTROCARDIOGRAM TRACING: CPT | Performed by: INTERNAL MEDICINE

## 2023-03-20 PROCEDURE — 85610 PROTHROMBIN TIME: CPT | Mod: 91 | Performed by: INTERNAL MEDICINE

## 2023-03-20 PROCEDURE — 84484 ASSAY OF TROPONIN QUANT: CPT | Performed by: INTERNAL MEDICINE

## 2023-03-20 PROCEDURE — 25000242 PHARM REV CODE 250 ALT 637 W/ HCPCS

## 2023-03-20 PROCEDURE — 85025 COMPLETE CBC W/AUTO DIFF WBC: CPT | Mod: 91

## 2023-03-20 PROCEDURE — 63700000 PHARM REV CODE 250 ALT 637 W/O HCPCS: Performed by: INTERNAL MEDICINE

## 2023-03-20 PROCEDURE — 82977 ASSAY OF GGT: CPT | Mod: 91

## 2023-03-20 PROCEDURE — 94799 UNLISTED PULMONARY SVC/PX: CPT

## 2023-03-20 PROCEDURE — 85610 PROTHROMBIN TIME: CPT | Mod: 91

## 2023-03-20 PROCEDURE — 80053 COMPREHEN METABOLIC PANEL: CPT | Performed by: INTERNAL MEDICINE

## 2023-03-20 PROCEDURE — 93010 EKG 12-LEAD: ICD-10-PCS | Mod: ,,, | Performed by: INTERNAL MEDICINE

## 2023-03-20 PROCEDURE — P9047 ALBUMIN (HUMAN), 25%, 50ML: HCPCS | Mod: JZ,JG

## 2023-03-20 PROCEDURE — 82553 CREATINE MB FRACTION: CPT | Mod: 91 | Performed by: INTERNAL MEDICINE

## 2023-03-20 PROCEDURE — 83690 ASSAY OF LIPASE: CPT | Mod: 91

## 2023-03-20 PROCEDURE — P9047 ALBUMIN (HUMAN), 25%, 50ML: HCPCS | Mod: JZ,JG | Performed by: INTERNAL MEDICINE

## 2023-03-20 PROCEDURE — 84484 ASSAY OF TROPONIN QUANT: CPT | Mod: 91

## 2023-03-20 RX ORDER — CALCIUM GLUCONATE 20 MG/ML
1 INJECTION, SOLUTION INTRAVENOUS
Status: COMPLETED | OUTPATIENT
Start: 2023-03-20 | End: 2023-03-20

## 2023-03-20 RX ORDER — ALBUMIN HUMAN 250 G/1000ML
12.5 SOLUTION INTRAVENOUS ONCE
Status: COMPLETED | OUTPATIENT
Start: 2023-03-20 | End: 2023-03-20

## 2023-03-20 RX ORDER — POTASSIUM CHLORIDE 14.9 MG/ML
20 INJECTION INTRAVENOUS ONCE
Status: DISCONTINUED | OUTPATIENT
Start: 2023-03-20 | End: 2023-03-21 | Stop reason: HOSPADM

## 2023-03-20 RX ORDER — DEXTROSE MONOHYDRATE 50 MG/ML
INJECTION, SOLUTION INTRAVENOUS CONTINUOUS
Status: DISCONTINUED | OUTPATIENT
Start: 2023-03-20 | End: 2023-03-21 | Stop reason: HOSPADM

## 2023-03-20 RX ORDER — LEVALBUTEROL INHALATION SOLUTION 1.25 MG/3ML
1.25 SOLUTION RESPIRATORY (INHALATION) EVERY 8 HOURS
Status: DISCONTINUED | OUTPATIENT
Start: 2023-03-20 | End: 2023-03-21 | Stop reason: HOSPADM

## 2023-03-20 RX ORDER — SODIUM CHLORIDE 450 MG/100ML
INJECTION, SOLUTION INTRAVENOUS CONTINUOUS
Status: DISCONTINUED | OUTPATIENT
Start: 2023-03-20 | End: 2023-03-21 | Stop reason: HOSPADM

## 2023-03-20 RX ORDER — POTASSIUM CHLORIDE 14.9 MG/ML
20 INJECTION INTRAVENOUS ONCE
Status: COMPLETED | OUTPATIENT
Start: 2023-03-20 | End: 2023-03-20

## 2023-03-20 RX ORDER — FUROSEMIDE 10 MG/ML
40 INJECTION INTRAMUSCULAR; INTRAVENOUS ONCE
Status: COMPLETED | OUTPATIENT
Start: 2023-03-20 | End: 2023-03-20

## 2023-03-20 RX ORDER — LEVALBUTEROL INHALATION SOLUTION 1.25 MG/3ML
SOLUTION RESPIRATORY (INHALATION)
Status: COMPLETED
Start: 2023-03-20 | End: 2023-03-20

## 2023-03-20 RX ORDER — ALBUMIN HUMAN 250 G/1000ML
SOLUTION INTRAVENOUS
Status: DISCONTINUED
Start: 2023-03-20 | End: 2023-03-21 | Stop reason: HOSPADM

## 2023-03-20 RX ORDER — NOREPINEPHRINE BITARTRATE/D5W 4MG/250ML
0-.2 PLASTIC BAG, INJECTION (ML) INTRAVENOUS CONTINUOUS
Status: DISCONTINUED | OUTPATIENT
Start: 2023-03-20 | End: 2023-03-21 | Stop reason: HOSPADM

## 2023-03-20 RX ORDER — IPRATROPIUM BROMIDE 0.5 MG/2.5ML
0.5 SOLUTION RESPIRATORY (INHALATION) EVERY 4 HOURS
Status: DISCONTINUED | OUTPATIENT
Start: 2023-03-20 | End: 2023-03-21 | Stop reason: HOSPADM

## 2023-03-20 RX ORDER — ALBUMIN HUMAN 250 G/1000ML
25 SOLUTION INTRAVENOUS ONCE
Status: COMPLETED | OUTPATIENT
Start: 2023-03-20 | End: 2023-03-20

## 2023-03-20 RX ORDER — FLUCONAZOLE 10 MG/ML
100 POWDER, FOR SUSPENSION ORAL DAILY
Status: DISCONTINUED | OUTPATIENT
Start: 2023-03-20 | End: 2023-03-21 | Stop reason: HOSPADM

## 2023-03-20 RX ADMIN — FLUCONAZOLE 100 MG: 10 POWDER, FOR SUSPENSION ORAL at 01:03

## 2023-03-20 RX ADMIN — IPRATROPIUM BROMIDE 0.5 MG: 0.5 SOLUTION RESPIRATORY (INHALATION) at 04:03

## 2023-03-20 RX ADMIN — FLUCONAZOLE 100 MG: 100 TABLET ORAL at 08:03

## 2023-03-20 RX ADMIN — ALBUMIN (HUMAN) 12.5 G: 12.5 SOLUTION INTRAVENOUS at 06:03

## 2023-03-20 RX ADMIN — AMPICILLIN SODIUM AND SULBACTAM SODIUM 1.5 G: 1; .5 INJECTION, POWDER, FOR SOLUTION INTRAMUSCULAR; INTRAVENOUS at 07:03

## 2023-03-20 RX ADMIN — ALBUMIN (HUMAN) 25 G: 12.5 SOLUTION INTRAVENOUS at 02:03

## 2023-03-20 RX ADMIN — FUROSEMIDE 40 MG: 10 INJECTION, SOLUTION INTRAMUSCULAR; INTRAVENOUS at 02:03

## 2023-03-20 RX ADMIN — POTASSIUM CHLORIDE 40 MEQ: 29.8 INJECTION, SOLUTION INTRAVENOUS at 07:03

## 2023-03-20 RX ADMIN — AMPICILLIN SODIUM AND SULBACTAM SODIUM 1.5 G: 1; .5 INJECTION, POWDER, FOR SOLUTION INTRAMUSCULAR; INTRAVENOUS at 01:03

## 2023-03-20 RX ADMIN — LEVALBUTEROL HYDROCHLORIDE 1.25 MG: 1.25 SOLUTION RESPIRATORY (INHALATION) at 03:03

## 2023-03-20 RX ADMIN — IPRATROPIUM BROMIDE 0.5 MG: 0.5 SOLUTION RESPIRATORY (INHALATION) at 10:03

## 2023-03-20 RX ADMIN — HYDROCORTISONE SODIUM SUCCINATE 100 MG: 100 INJECTION, POWDER, FOR SOLUTION INTRAMUSCULAR; INTRAVENOUS at 08:03

## 2023-03-20 RX ADMIN — LEVETIRACETAM 500 MG: 5 INJECTION INTRAVENOUS at 06:03

## 2023-03-20 RX ADMIN — CALCIUM GLUCONATE 1 G: 20 INJECTION, SOLUTION INTRAVENOUS at 04:03

## 2023-03-20 RX ADMIN — IPRATROPIUM BROMIDE 0.5 MG: 0.5 SOLUTION RESPIRATORY (INHALATION) at 07:03

## 2023-03-20 RX ADMIN — CALCIUM GLUCONATE 1 G: 20 INJECTION, SOLUTION INTRAVENOUS at 03:03

## 2023-03-20 RX ADMIN — VASOPRESSIN 0.01 UNITS/HR: 20 INJECTION INTRAVENOUS at 12:03

## 2023-03-20 RX ADMIN — NOREPINEPHRINE BITARTRATE 0.05 MCG/KG/MIN: 4 INJECTION, SOLUTION INTRAVENOUS at 12:03

## 2023-03-20 RX ADMIN — SODIUM CHLORIDE, SODIUM LACTATE, POTASSIUM CHLORIDE, AND CALCIUM CHLORIDE 1000 ML: .6; .31; .03; .02 INJECTION, SOLUTION INTRAVENOUS at 11:03

## 2023-03-20 RX ADMIN — ALBUMIN (HUMAN) 12.5 G: 12.5 SOLUTION INTRAVENOUS at 05:03

## 2023-03-20 RX ADMIN — POTASSIUM CHLORIDE: 2 INJECTION, SOLUTION, CONCENTRATE INTRAVENOUS at 08:03

## 2023-03-20 RX ADMIN — DEXTROSE: 5 SOLUTION INTRAVENOUS at 02:03

## 2023-03-20 RX ADMIN — CEFEPIME HYDROCHLORIDE 2 G: 2 INJECTION, SOLUTION INTRAVENOUS at 08:03

## 2023-03-20 RX ADMIN — SODIUM CHLORIDE: 0.45 INJECTION, SOLUTION INTRAVENOUS at 01:03

## 2023-03-20 RX ADMIN — MUPIROCIN 1 G: 20 OINTMENT TOPICAL at 08:03

## 2023-03-20 RX ADMIN — IPRATROPIUM BROMIDE 0.5 MG: 0.5 SOLUTION RESPIRATORY (INHALATION) at 08:03

## 2023-03-20 RX ADMIN — CHLORHEXIDINE GLUCONATE 15 ML: 1.2 RINSE ORAL at 08:03

## 2023-03-20 RX ADMIN — THIAMINE HYDROCHLORIDE 100 MG: 100 INJECTION, SOLUTION INTRAMUSCULAR; INTRAVENOUS at 08:03

## 2023-03-20 RX ADMIN — NOREPINEPHRINE BITARTRATE 0.05 MCG/KG/MIN: 4 INJECTION, SOLUTION INTRAVENOUS at 08:03

## 2023-03-20 RX ADMIN — CALCIUM GLUCONATE 1 G: 20 INJECTION, SOLUTION INTRAVENOUS at 02:03

## 2023-03-20 RX ADMIN — POTASSIUM CHLORIDE 20 MEQ: 14.9 INJECTION, SOLUTION INTRAVENOUS at 08:03

## 2023-03-20 RX ADMIN — PANTOPRAZOLE SODIUM 40 MG: 40 INJECTION, POWDER, FOR SOLUTION INTRAVENOUS at 08:03

## 2023-03-20 RX ADMIN — POTASSIUM CHLORIDE: 2 INJECTION, SOLUTION, CONCENTRATE INTRAVENOUS at 10:03

## 2023-03-20 RX ADMIN — POTASSIUM CHLORIDE 20 MEQ: 14.9 INJECTION, SOLUTION INTRAVENOUS at 09:03

## 2023-03-20 RX ADMIN — LEVALBUTEROL HYDROCHLORIDE 1.25 MG: 1.25 SOLUTION RESPIRATORY (INHALATION) at 07:03

## 2023-03-20 RX ADMIN — IPRATROPIUM BROMIDE 0.5 MG: 0.5 SOLUTION RESPIRATORY (INHALATION) at 03:03

## 2023-03-20 RX ADMIN — HYDROCORTISONE SODIUM SUCCINATE 100 MG: 100 INJECTION, POWDER, FOR SOLUTION INTRAMUSCULAR; INTRAVENOUS at 06:03

## 2023-03-20 NOTE — PROGRESS NOTES
Pronouncement of Brain Death    I examined pt and she had no corneals, no pupil response, no Doll's eyes, no gag reflex, no response to pain, no withdrawal, no spontaneous respiratory effort.  We proceeded with an apnea test.  Baseline ABG checked and pt was removed from ventilator and observed.  She took no spontaneous efforts, had desaturation and after 5 minutes the test had to be terminated due to hypotension.  Repeat ABG showed paCO2 had increased from 36 to 50 with no effort made.  [facility] is on her case.     Pt was declared  at 0744 3/20/2023.    Bronchoscopy was done for airway inspection for [facility] and their form was filled out.      Dominic Patrick MD  [facility] Pulmonary/Critical Care  2023

## 2023-03-20 NOTE — CARE UPDATE
This note has been moved to another encounter. If you have any questions, please contact HIM Chart Correction at [phone].

## 2023-03-20 NOTE — PROCEDURES
This note has been moved to another encounter. If you have any questions, please contact HIM Chart Correction at [phone].

## 2023-03-20 NOTE — PROGRESS NOTES
This note has been moved to another encounter. If you have any questions, please contact HIM Chart Correction at [phone].

## 2023-03-20 NOTE — PROCEDURES
Bronchoscopy for [facility]    Pt. In under [facility] care and I was asked to do bronchoscopy for airway inspection.  The bronchoscopy was done through [facility].  There were no significant findings except for some scattered secretions.  The [facility] bronchoscopy form was filled out.      Dominic Patrick MD  [facility] Pulmonary/Critical Care  03/20/2023

## 2023-03-20 NOTE — CARE UPDATE
03/19/23 2002   Patient Assessment/Suction   Level of Consciousness (AVPU) unresponsive   Respiratory Effort Normal   Expansion/Accessory Muscles/Retractions no use of accessory muscles   All Lung Fields Breath Sounds clear   Rhythm/Pattern, Respiratory assisted mechanically   Cough Frequency with stimulation   Cough Type assisted   Suction Method tracheal   Suction Pressure (mmHg) -120 mmHg   $ Suction Charges Inline Suction Procedure Stat Charge   Secretions Amount scant   Secretions Color red-streaked   Secretions Characteristics thick   PRE-TX-O2   Device (Oxygen Therapy) ventilator   $ Is the patient on Low Flow Oxygen? Yes   Oxygen Concentration (%) 35   SpO2 100 %   Pulse Oximetry Type Continuous   $ Pulse Oximetry - Multiple Charge Pulse Oximetry - Multiple   Pulse (!) 125   Resp (!) 25   Aerosol Therapy   $ Aerosol Therapy Charges Aerosol Treatment   Daily Review of Necessity (SVN) completed   Respiratory Treatment Status (SVN) given   Treatment Route (SVN) in-line   Patient Position (SVN) semi-Mckinley's   Post Treatment Assessment (SVN) breath sounds unchanged;vital signs unchanged   Signs of Intolerance (SVN) none        Airway - Non-Surgical 03/17/23 2132   Placement Date/Time: 03/17/23 2132   Method of Intubation: Glidescope  Airway Device Size: 7.5  Style: Cuffed  Cuff Inflated With: Air  Secured at: Lips  Insertion attempts (enter comment if more than 2 attempts): 1   Secured at 23 cm   Measured At Lips   Secured Location Center   Secured by Commercial tube mccain   Bite Block none   Site Condition Cool;Dry   Status Intact   Site Assessment Clean;Dry;No bleeding;No drainage   Cuff Volume   (MLT)   Vent Select   Conventional Vent Y   Charged w/in last 24h YES   Preset Conventional Ventilator Settings   Vent Type    Ventilation Type VC   Vent Mode A/C   Humidity HME   Set Rate 25 BPM   Vt Set 500 mL   PEEP/CPAP 5 cmH20   Peak Flow 80 L/min   Peak End Inspiratory Pressure 20 cmH20   I-Trigger  Type  V-TRIG   Trigger Sensitivity Flow/I-Trigger 3 L/min   Patient Ventilator Parameters   Resp Rate Total 25 br/min   Peak Airway Pressure 28 cmH20   Mean Airway Pressure 11 cmH20   Plateau Pressure 28 cmH20   Exhaled Vt 517 mL   Total Ve 12.9 L/m   I:E Ratio Measured 1:2.50   Auto PEEP 0 cmH20   Conventional Ventilator Alarms   Alarms On Y   Ve High Alarm 25 L/min   Ve Low Alarm 5 L/min   Vt High Alarm 1200 mL   Vt Low Alarm 200 mL   Resp Rate High Alarm 45 br/min   Press High Alarm 50 cmH2O   Apnea Rate 30   Apnea Volume (mL) 1 mL   Apnea Oxygen Concentration  100   Apnea Flow Rate (L/min) 80   T Apnea 20 sec(s)   Ready to Wean/Extubation Screen   FIO2<=50 (chart decimal) 0.35   MV<16L (chart vol.) 12.9   PEEP <=8 (chart #) 5   Ready to Wean Parameters   F/VT Ratio<105 (RSBI) (!) 48.36   Vital Capacity   Vital Capacity (mL) 0   Education   $ Education Bronchodilator;Ventilator Oxygen;15 min   Respiratory Evaluation   $ Care Plan Tech Time 15 min

## 2023-03-20 NOTE — PROGRESS NOTES
Brain death exam    I was asked to perform a brain death exam after [facility] assumed care.  During my exam, patient was normothermic, normotensive and tachycardic with heart rate at 120s, breathing at the ventilator rate.  On exam, she had absent brainstem reflexes as mentioned below    -patient was intubated, not on any sedation and on ventilator.  Does not respond to verbal or noxious stimulus.  - Absent pupillary response with fixed and dilated pupils bilaterally, absent corneal reflex bilaterally, absent cough and gag reflex, absent doll's eye reflex.  Performed cold caloric exam in bilateral ears with no movement of her eyes.  Patient did not have any withdrawal to noxious stimulus in her extremities.    -Earlier, an apnea test was performed by Dr Case where patient did not take any spontaneous efforts, had desaturation and test was terminated after 5 minutes due to hypotension.  ABG showed increased PaCO2 from 36 to 50.     - Based on the above exam, patient is declared brain dead.   - Discussed with primary team, nursing and [facility]        Critical Care:  37 minutes of critical care time has been spent evaluating with the patient. Time includes chart review not limited to diagnostic imaging, labs, and vitals, patient assessment, discussion with family and nursing, current order evaluations, and new order entries.             Prem Downey MD  Neurology/Vascular Neurology

## 2023-03-20 NOTE — DISCHARGE SUMMARY
"[facility]  Discharge Summary  Patient Name: Mishel Ruelas MRN: [id number]   Patient Class: IP- Inpatient  Length of Stay: 3   Admission Date: 3/17/2023  9:29 PM Attending Physician: Campbell Interiano MD   Primary Care Provider: Primary Doctor No Face-to-Face encounter date: 2023   Chief Complaint: Drug Overdose (CARDIAC ARREST. LAST KNOWN ALIVE . )    Date of Discharge: 3/20/2023  Discharge Disposition: Organ Donor    Condition: Stable       Reason for Hospitalization     Active Hospital Problems    Diagnosis    *Cardiac arrest    Acute hypoxemic respiratory failure    Lactic acidosis    Hypophosphatemia    Hypokalemia    Leukocytosis    Transaminitis    Acute systolic congestive heart failure         Brief History of Present Illness    Mishel Ruelas is a 27 y.o.  female who  has a past medical history of Seizures.. The patient presented to [facility] on 3/17/2023 with a primary complaint of Drug Overdose (CARDIAC ARREST. LAST KNOWN ALIVE . )  .     For the full HPI please refer to the History & Physical from this admission.    Hospital Course By Problem with Pertinent Findings     Admitted for post-cardiac arrest care secondary to respiratory failure in the setting of IV Drug use. Hypothermic protocol initiated. Patient was started on broad spectrum antibiotics, no growth on cultures. No neurological recovery after rewarming. Brain death declared by two physicians independently. Declared  at 0744 3/20/2023.  Patient organ donor. [facility] assumed care.        Physical Exam  /87   Pulse (!) 125   Temp 96.6 °F (35.9 °C)   Resp 14   Ht 5' 5" (1.651 m)   Wt 65 kg (143 lb 4.8 oz)   SpO2 100%   BMI 23.85 kg/m²   Vitals reviewed.    Constitutional: on vent  HENT: Atraumatic.   Cardiovascular: Normal rate, regular rhythm and normal heart sounds.   Pulmonary/Chest: breathing with help of ventilator  Abdominal: Soft. Bowel sounds are " normal. Exhibits no distension and no mass. No tenderness  Neurological: no brain stem reflexes  Skin: Skin is warm and dry.     Following labs were Reviewed   Recent Labs   Lab 03/20/23  0512 03/20/23  0738   WBC 10.76  --    HGB 10.5*  --    HCT 29.6* 28*   *  --    CALCIUM 7.5*  --    ALBUMIN 3.2*  --    PROT 5.5*  --    *  --    K 3.7  --    CO2 18*  --      --    BUN 9  --    CREATININE 0.6  --    ALKPHOS 50*  --    ALT 46*  --    AST 49*  --    BILITOT 0.8  --      No results found for: POCTGLUCOSE     All labs within the past 24 hours have been reviewed    Microbiology Results (last 7 days)       Procedure Component Value Units Date/Time    Culture, Respiratory with Gram Stain [[id number]] Collected: 03/19/23 1120    Order Status: Completed Specimen: Sputum Updated: 03/20/23 0655     Respiratory Culture Normal respiratory mike    Urine Culture High Risk [[id number]] Collected: 03/19/23 1305    Order Status: Completed Specimen: Urine, Clean Catch Updated: 03/20/23 0647     Urine Culture, Routine No growth to date    Narrative:      Indicated criteria for high risk culture:->Other  Other (specify):->[facility]    Blood culture [[id number]] Collected: 03/19/23 1640    Order Status: Completed Specimen: Blood Updated: 03/19/23 2358     Blood Culture, Routine No Growth to date    Narrative:      Collection has been rescheduled by SLR at 03/19/2023 16:30   Collection has been rescheduled by RE1 at 03/19/2023 16:30 Reason:   Unable to collect  Collection has been rescheduled by SLR at 03/19/2023 16:41   Collection has been rescheduled by SLR at 03/19/2023 16:30   Collection has been rescheduled by RE1 at 03/19/2023 16:30 Reason:   Unable to collect  Collection has been rescheduled by SLR at 03/19/2023 16:41     Blood culture [[id number]] Collected: 03/19/23 1640    Order Status: Completed Specimen: Blood Updated: 03/19/23 2358     Blood Culture, Routine No Growth to date    Narrative:      Collection has  been rescheduled by SLR at 03/19/2023 16:30   Collection has been rescheduled by RE1 at 03/19/2023 16:30 Reason:   Unable to collect  Collection has been rescheduled by SLR at 03/19/2023 16:41   Collection has been rescheduled by SLR at 03/19/2023 16:30   Collection has been rescheduled by RE1 at 03/19/2023 16:30 Reason:   Unable to collect  Collection has been rescheduled by SLR at 03/19/2023 16:41     Respiratory Infection Panel (PCR), Nasopharyngeal [[id number]] Collected: 03/19/23 1440    Order Status: Completed Updated: 03/19/23 1902     Respiratory Infection Panel Source NP swab     Adenovirus Not Detected     Coronavirus 229E, Common Cold Virus Not Detected     Coronavirus HKU1, Common Cold Virus Not Detected     Coronavirus NL63, Common Cold Virus Not Detected     Coronavirus OC43, Common Cold Virus Not Detected     Comment: The Coronavirus strains detected in this test cause the common cold.  These strains are not the COVID-19 (novel Coronavirus)strain   associated with the respiratory disease outbreak.          SARS-CoV2 (COVID-19) Qualitative PCR Not Detected     Human Metapneumovirus Not Detected     Human Rhinovirus/Enterovirus Not Detected     Influenza A (subtypes H1, H1-2009,H3) Not Detected     Influenza B Not Detected     Parainfluenza Virus 1 Not Detected     Parainfluenza Virus 2 Not Detected     Parainfluenza Virus 3 Not Detected     Parainfluenza Virus 4 Not Detected     Respiratory Syncytial Virus Not Detected     Bordetella Parapertussis (NM7170) Not Detected     Bordetella pertussis (ptxP) Not Detected     Chlamydia pneumoniae Not Detected     Mycoplasma pneumoniae Not Detected     Comment: Respiratory Infection Panel testing performed by Multiplex PCR.       Narrative:      Unable to find order for adult.  Specimen Source->Nasopharyngeal Swab    Blood culture [[id number]] Collected: 03/19/23 1742    Order Status: Sent Specimen: Blood from Line, Jugular, Internal Right Updated: 03/19/23 1743     Blood culture [[id number]] Collected: 03/19/23 1742    Order Status: Sent Specimen: Blood from Line, Arterial, Right Updated: 03/19/23 1743    Resp Viral Panel PCR, Peds Under 7 Months Nasopharyngeal Swab [[id number]] Collected: 03/19/23 1440    Order Status: Canceled     MRSA Screen by PCR [[id number]] Collected: 03/18/23 1137    Order Status: Completed Specimen: Nasopharyngeal Swab from Nasal Updated: 03/18/23 1308     MRSA SCREEN BY PCR Negative    Blood culture [[id number]]     Order Status: Sent Specimen: Blood           X-Ray Chest AP Portable   Final Result      X-Ray Chest AP Portable   Final Result      X-Ray Chest AP Portable   Final Result      US Upper Extremity Arteries Left   Final Result      X-Ray Chest 1 View   Final Result      US Upper Extremity Veins Left   Final Result      CT Abdomen Pelvis With Contrast   Final Result      CTA Chest Non-Coronary (PE Studies)   Final Result      CT Cervical Spine Without Contrast   Final Result      CT Head Without Contrast   Final Result      X-Ray Chest AP Portable   Final Result          No results found for this or any previous visit.      Consultants and Procedures   Consultants:  Consults (From admission, onward)          Status Ordering Provider     Inpatient consult to Pulmonology  Once        Provider:  (Not yet assigned)    Completed BENNY DAVE     Inpatient consult to Vascular Surgery  Once        Provider:  Ali Khoobehi, MD    Completed MURALI GREY     Inpatient consult to Registered Dietitian/Nutritionist  Once        Provider:  (Not yet assigned)    Completed MURALI GREY     Inpatient consult to Palliative Care  Once        Provider:  Enmanuel Hardy MD    Completed MURALI GREY     Inpatient consult to Cardiology  Once        Provider:  Willie Bernal MD    Completed MURALI GREY     Inpatient consult to Neurology  Once        Provider:  Cecilia Holland MD    Completed MURALI GREY      Inpatient consult to Intensivist  Once        Provider:  Dominic Patrick MD    Completed MURALI GREY            Procedures:   Procedure(s) (LRB):  Bronchoscopy (N/A)       I spent 40 minutes preparing the discharge including reviewing records from previous encounters, preparation of discharge summary, assessing and final examination of the patient, discharge medicine reconciliation, discussing plan of care, follow up and education and prescriptions.       Campbell Interiano  [facility] Hospitalist  03/20/2023

## 2023-03-20 NOTE — CARE UPDATE
03/20/23 0747   Patient Assessment/Suction   Level of Consciousness (AVPU) unresponsive   PRE-TX-O2   Device (Oxygen Therapy) ventilator   Oxygen Concentration (%) 100   SpO2 100 %   Pulse (!) 129   Resp 14   BP (!) 152/100   Vent Select   Charged w/in last 24h YES   Preset Conventional Ventilator Settings   Ventilation Type VC   Vent Mode A/C   Set Rate 14 BPM   Vt Set 500 mL   PEEP/CPAP 8 cmH20   Peak Flow 80 L/min   Peak End Inspiratory Pressure 22 cmH20   I-Trigger Type  V-TRIG   Trigger Sensitivity Flow/I-Trigger 3 L/min   Patient Ventilator Parameters   Resp Rate Total 14 br/min   Peak Airway Pressure 31 cmH20   Mean Airway Pressure 12 cmH20   Plateau Pressure 28 cmH20   Exhaled Vt 523 mL   Total Ve 7.32 L/m   I:E Ratio Measured 1:5.30   Auto PEEP 0 cmH20   Conventional Ventilator Alarms   Resp Rate High Alarm 45 br/min   Press High Alarm 50 cmH2O   Apnea Rate 10   Apnea Volume (mL) 1 mL   Apnea Oxygen Concentration  100   Apnea Flow Rate (L/min) 80   T Apnea 20 sec(s)   Ready to Wean/Extubation Screen   FIO2<=50 (chart decimal) (!) 1   MV<16L (chart vol.) 7.32   PEEP <=8 (chart #) 8   Ready to Wean Parameters   F/VT Ratio<105 (RSBI) (!) 26.77   Vital Capacity   Vital Capacity (mL) 0   Labs   $ Was an ABG obtained? Arterial Blood Draw from Existing Line;ISTAT - Blood gas;ISTAT - PH, Blood   $ Labs Tech Time 15 min   Critical Value Communication   Date Result Received 03/20/23   Time Result Received 0747   Resulting Department of Critical Value resp   Who communicated critical value from resulting department? Shauna Stephens RRT   Critical Test #1 pco2   Critical Test #1 Result 50   Critical Test #2 PO2   Critical Test #2 Result 302   Name of Notified Physician/Designee Dr Patrick   Date Notified 03/20/23   Time Notified 0747   Read Back Verification Yes   Provider Notification   Reason for Communication Evaluate   Provider Name Dr. Patrick   Provider Role Consulting physician   Method of Communication Face  to face   Response No new orders   Notification Time 0747   Shift Event Other   Respiratory Evaluation   $ Care Plan Tech Time 15 min   $ Eval/Re-eval Charges Evaluation   Evaluation For New Orders        Latest Reference Range & Units 03/20/23 07:38 03/20/23 07:47   Sample  ARTERIAL ARTERIAL   POC PH 7.35 - 7.45  7.371 7.309 (L)   POC PCO2 35 - 45 mmHg 36.0 50.4 (H)   POC PO2 80 - 100 mmHg 138 (H) 302 (H)   POC HCO3 24 - 28 mmol/L 20.8 (L) 25.3   POC TCO2 23 - 27 mmol/L 22 (L) 27   POC SATURATED O2 95 - 100 % 99 100   POC Ionized Calcium 1.06 - 1.42 mmol/L 1.18    POC Sodium 136 - 145 mmol/L 138    POC Potassium 3.5 - 5.1 mmol/L 4.2    Allens Test  N/A N/A   POC Glucose 70 - 110 mg/dL 123 (H)    POC Hematocrit 36 - 54 %PCV 28 (L)    POC BE -2 to 2 mmol/L -4 -1   FiO2  100    Vt  500    PiP  30    PEEP  8    DelSys  Adult Vent Room Air   Site  Claudia/UAC Claudia/UAC   Mode  AC/PRVC SPONT   Rate  14        Pre and Post apnea ABG.   pt placed back on ventilator due to unstable vitals.Apnea test terminated

## 2023-03-20 NOTE — CONSULTS
Advance Care Planning     Date: 03/20/2023  Pt is a [facility] case. Palliative will sign off. Thank you for consult.

## 2023-03-21 LAB
BACTERIA SPEC AEROBE CULT: NORMAL
GRAM STN SPEC: NORMAL
HIV 1+2 AB+HIV1 P24 AG SERPL QL IA: NON REACTIVE

## 2023-03-21 NOTE — PLAN OF CARE
03/20/23 2100   Patient Assessment/Suction   Level of Consciousness (AVPU) unresponsive   Respiratory Effort Unlabored   Expansion/Accessory Muscles/Retractions expansion symmetric   All Lung Fields Breath Sounds clear   Rhythm/Pattern, Respiratory assisted mechanically   Cough Frequency with stimulation   Cough Type assisted   Suction Method tracheal   $ Suction Charges Inline Suction Procedure Stat Charge   Secretions Amount small   Secretions Color rehan   Secretions Characteristics thick   PRE-TX-O2   Device (Oxygen Therapy) ventilator   $ Is the patient on Low Flow Oxygen? Yes   Oxygen Concentration (%) 40   SpO2 100 %   Pulse Oximetry Type Continuous   $ Pulse Oximetry - Multiple Charge Pulse Oximetry - Multiple   Pulse (!) 123   Resp 10   Temp 98.2 °F (36.8 °C)   BP (!) 95/58   Vent Select   Charged w/in last 24h YES   Preset Conventional Ventilator Settings   Vent Type    Ventilation Type PC   Vent Mode A/C   Set Rate 16 BPM   PEEP/CPAP 10 cmH20   Set Inspiratory Pressure 11 cmH20   Peak End Inspiratory Pressure 21 cmH20   Insp Time 1.63 Sec(s)   Insp Rise Time  70 %   I-Trigger Type  V-TRIG   Trigger Sensitivity Flow/I-Trigger 3 L/min   Patient Ventilator Parameters   Resp Rate Total 16 br/min   Peak Airway Pressure 21 cmH20   Mean Airway Pressure 15 cmH20   Plateau Pressure 28 cmH20   Exhaled Vt 466 mL   Total Ve 7.43 L/m   I:E Ratio Measured 1:1.30   Auto PEEP 0 cmH20   Inspired Tidal Volume (VTI) 0 mL   Conventional Ventilator Alarms   Resp Rate High Alarm 45 br/min   Press High Alarm 50 cmH2O   Apnea Rate 10   Apnea Volume (mL) 1 mL   Apnea Oxygen Concentration  100   Apnea Flow Rate (L/min) 80   T Apnea 20 sec(s)   Ready to Wean/Extubation Screen   FIO2<=50 (chart decimal) 0.4   MV<16L (chart vol.) 7.43   PEEP <=8 (chart #) (!) 10   Ready to Wean Parameters   F/VT Ratio<105 (RSBI) (!) 21.46   Vital Capacity   Vital Capacity (mL) 0   Education   $ Education Ventilator Oxygen

## 2023-03-21 NOTE — NURSING TRANSFER
Nursing Transfer Note      3/20/2023     Reason patient is being transferred: [facility]    Transfer To: [facility] From: 2201    Transfer via Ambulance    Transfer with  Vent, cardiac monitoring  Levo    Transported by [facility]    Medicines sent: Levo, Vasopressin D5 20K    Any special needs or follow-up needed: None    Chart send with patient: No    Notified: Parents at bedside    Patient reassessed at: 3/20/2023 Upon arrival to [facility]    Upon arrival to floor: [facility]  
done

## 2023-03-23 LAB — BACTERIA UR CULT: NO GROWTH

## 2023-03-24 LAB
BACTERIA BLD CULT: NORMAL
BACTERIA BLD CULT: NORMAL

## 2023-03-26 LAB
AMPHETAMINES SERPL QL SCN: NEGATIVE NG/ML
BARBITURATES SERPL QL SCN: NEGATIVE UG/ML
BENZODIAZ SERPL QL SCN: NEGATIVE NG/ML
BENZODIAZ SERPL QL SCN: NEGATIVE NG/ML
CANNABINOIDS SERPL QL SCN: NEGATIVE NG/ML
METHADONE SERPL QL SCN: NEGATIVE NG/ML
OPIATES SERPL QL SCN: NEGATIVE NG/ML
OXYCODONE+OXYMORPHONE SERPLBLD QL SCN: NEGATIVE NG/ML
PCP SERPL QL SCN: NEGATIVE NG/ML
PROPOXYPH SERPL QL SCN: NEGATIVE NG/ML

## 2023-03-27 LAB
DIAGNOSTIC IMP SPEC-IMP: NORMAL
HAV IGM SERPL QL IA: NEGATIVE
HBV CORE IGM SERPL QL IA: NEGATIVE
HBV SURFACE AG SERPL QL IA: NEGATIVE
HCV AB S/CO SERPL IA: REACTIVE
HCV RNA SERPL NAA+PROBE-ACNC: NORMAL IU/ML
REF LAB TEST REF RANGE: NORMAL

## 2023-06-19 PROBLEM — J96.01 ACUTE HYPOXEMIC RESPIRATORY FAILURE: Status: RESOLVED | Noted: 2023-03-18 | Resolved: 2023-06-19
